# Patient Record
Sex: MALE | Race: WHITE | Employment: FULL TIME | URBAN - METROPOLITAN AREA
[De-identification: names, ages, dates, MRNs, and addresses within clinical notes are randomized per-mention and may not be internally consistent; named-entity substitution may affect disease eponyms.]

---

## 2020-09-02 ENCOUNTER — HOSPITAL ENCOUNTER (OUTPATIENT)
Age: 60
Discharge: HOME | End: 2020-09-02
Attending: FAMILY MEDICINE
Payer: COMMERCIAL

## 2020-09-02 VITALS
HEIGHT: 70 IN | BODY MASS INDEX: 22.19 KG/M2 | DIASTOLIC BLOOD PRESSURE: 96 MMHG | RESPIRATION RATE: 18 BRPM | WEIGHT: 155 LBS | OXYGEN SATURATION: 100 % | TEMPERATURE: 98 F | SYSTOLIC BLOOD PRESSURE: 161 MMHG | HEART RATE: 69 BPM

## 2020-09-02 DIAGNOSIS — S61.211A LACERATION OF LEFT INDEX FINGER WITHOUT FOREIGN BODY WITHOUT DAMAGE TO NAIL, INITIAL ENCOUNTER: Primary | ICD-10-CM

## 2020-09-02 PROCEDURE — 12001 RPR S/N/AX/GEN/TRNK 2.5CM/<: CPT | Performed by: FAMILY MEDICINE

## 2020-09-02 PROCEDURE — 99202 OFFICE O/P NEW SF 15 MIN: CPT | Performed by: FAMILY MEDICINE

## 2020-09-02 NOTE — ED NOTES
Bacitracin and tube gauze dressing applied.  Encouraged to follow up with either his pcp or us in 2 days for wound re-eval. Suture removal 7-10 days either here or pcp office

## 2020-09-02 NOTE — ED PROVIDER NOTES
Patient Seen in: [facility] In [place]      History   Patient presents with:  Laceration Abrasion    Stated Complaint: Cut on Left Hand Finger    HPI    Pt is a 64 yo with aleft index finger laceration about 10 hours ago.  He got th Reviewed - No data to display               MDM   After irrigation and sterile prep, 1 cc lidocaine was injected and 8 simple interrupted 5-0 sutures were placed. Wound care was discussed.               Disposition and Plan     Clinical Impression:  Kenneth Bryson

## 2020-09-02 NOTE — ED INITIAL ASSESSMENT (HPI)
Caught left index finger pad in a garage door this am with lac of pad. No bleeding. laast tetanus 3-4 years ago.

## 2020-09-10 ENCOUNTER — HOSPITAL ENCOUNTER (OUTPATIENT)
Age: 60
Discharge: HOME | End: 2020-09-10
Attending: EMERGENCY MEDICINE
Payer: COMMERCIAL

## 2020-09-10 VITALS
DIASTOLIC BLOOD PRESSURE: 84 MMHG | WEIGHT: 155 LBS | OXYGEN SATURATION: 100 % | HEIGHT: 70 IN | HEART RATE: 69 BPM | TEMPERATURE: 97 F | RESPIRATION RATE: 18 BRPM | BODY MASS INDEX: 22.19 KG/M2 | SYSTOLIC BLOOD PRESSURE: 138 MMHG

## 2020-09-10 DIAGNOSIS — Z48.02 ENCOUNTER FOR REMOVAL OF SUTURES: Primary | ICD-10-CM

## 2020-09-10 PROCEDURE — 99212 OFFICE O/P EST SF 10 MIN: CPT | Performed by: EMERGENCY MEDICINE

## 2020-09-10 NOTE — ED PROVIDER NOTES
Patient Seen in: [facility]      History   Patient presents with:  Sut Stap Sree    Stated Complaint: suture removal    HPI  Patient presents for suture removal.  8 days ago had stitches placed in his left index f Disposition and Plan     Clinical Impression:  Encounter for removal of sutures  (primary encounter diagnosis)    Disposition:  Discharge  9/10/2020  6:45 pm    Follow-up:  [address] in [address]

## 2023-11-19 ENCOUNTER — HOSPITAL ENCOUNTER (EMERGENCY)
Facility: HOSPITAL | Age: 63
Discharge: HOME | End: 2023-11-19
Attending: EMERGENCY MEDICINE
Payer: COMMERCIAL

## 2023-11-19 VITALS
OXYGEN SATURATION: 97 % | HEIGHT: 70 IN | RESPIRATION RATE: 16 BRPM | WEIGHT: 150 LBS | DIASTOLIC BLOOD PRESSURE: 97 MMHG | BODY MASS INDEX: 21.47 KG/M2 | TEMPERATURE: 99 F | HEART RATE: 73 BPM | SYSTOLIC BLOOD PRESSURE: 167 MMHG

## 2023-11-19 DIAGNOSIS — H11.421 CHEMOSIS OF RIGHT CONJUNCTIVA: Primary | ICD-10-CM

## 2023-11-19 PROCEDURE — 99283 EMERGENCY DEPT VISIT LOW MDM: CPT

## 2023-11-19 RX ORDER — KETOTIFEN FUMARATE 0.35 MG/ML
1 SOLUTION/ DROPS OPHTHALMIC 2 TIMES DAILY
Qty: 5 ML | Refills: 0 | Status: SHIPPED | OUTPATIENT
Start: 2023-11-19 | End: 2023-11-26

## 2023-11-19 RX ORDER — TETRACAINE HYDROCHLORIDE 5 MG/ML
1 SOLUTION OPHTHALMIC ONCE
Status: COMPLETED | OUTPATIENT
Start: 2023-11-19 | End: 2023-11-19

## 2023-11-19 NOTE — ED INITIAL ASSESSMENT (HPI)
Pt arrives through triage with complaints of right eye swelling that started earlier today after cutting a tree around 1500. Pt denies pain, drainage, blurry/ double vision. Pt not aware of any allergies.

## 2024-03-18 ENCOUNTER — HOSPITAL ENCOUNTER (OUTPATIENT)
Dept: GENERAL RADIOLOGY | Facility: HOSPITAL | Age: 64
Discharge: HOME | End: 2024-03-18
Attending: FAMILY MEDICINE
Payer: COMMERCIAL

## 2024-03-18 DIAGNOSIS — M25.552 BILATERAL HIP PAIN: ICD-10-CM

## 2024-03-18 DIAGNOSIS — M25.551 BILATERAL HIP PAIN: ICD-10-CM

## 2024-03-18 PROBLEM — F41.1 GENERALIZED ANXIETY DISORDER: Status: ACTIVE | Noted: 2019-04-17

## 2024-03-18 PROBLEM — E55.9 VITAMIN D DEFICIENCY: Status: ACTIVE | Noted: 2019-01-21

## 2024-03-18 PROBLEM — F32.A DEPRESSIVE DISORDER: Status: ACTIVE | Noted: 2019-04-17

## 2024-03-18 PROCEDURE — 73522 X-RAY EXAM HIPS BI 3-4 VIEWS: CPT | Performed by: FAMILY MEDICINE

## 2024-03-19 DIAGNOSIS — M25.551 BILATERAL HIP PAIN: Primary | ICD-10-CM

## 2024-03-19 DIAGNOSIS — M25.552 BILATERAL HIP PAIN: Primary | ICD-10-CM

## 2024-04-15 NOTE — TELEPHONE ENCOUNTER
Please review. Protocol Failed; No Protocol    Requested Prescriptions   Pending Prescriptions Disp Refills    PAROXETINE 30 MG Oral Tab [Pharmacy Med Name: PAROXETINE HCL 30 MG TABLET] 30 tablet 0     Sig: TAKE 1 TABLET BY MOUTH EVERY DAY       Psychiatric Non-Scheduled (Anti-Anxiety) Failed - 4/14/2024 12:36 AM        Failed - Depression Screening completed within the past 12 months        Passed - In person appointment or virtual visit in the past 6 mos or appointment in next 3 mos     Recent [facility]patient Visits              [facility]          Future Appointments         Provider Department Appt Notes    In 2 weeks Caesar Negro MD [facility] Bilateral hip pain                           Future Appointments         Provider Department Appt Notes    In 2 weeks Caesar Negro MD [facility] Bilateral hip pain          Recent Outpatient Visits              4 weeks ago [facility]    [facility] Visit

## 2024-04-19 RX ORDER — PAROXETINE 30 MG/1
30 TABLET, FILM COATED ORAL DAILY
Qty: 30 TABLET | Refills: 1 | Status: SHIPPED | OUTPATIENT
Start: 2024-04-19 | End: 2024-06-13

## 2024-04-19 NOTE — TELEPHONE ENCOUNTER
60 day refill given for Dr. Lang  Please call patient to schedule follow-up with Dr. Lang for further refills.

## 2024-04-23 NOTE — TELEPHONE ENCOUNTER
2nd attempt/not able to leave a message. Per message patient has a voice mail box that has not been set up yet.

## 2024-05-01 ENCOUNTER — OFFICE VISIT (OUTPATIENT)
Dept: ORTHOPEDICS CLINIC | Facility: CLINIC | Age: 64
End: 2024-05-01
Payer: COMMERCIAL

## 2024-05-01 VITALS — WEIGHT: 151 LBS | HEIGHT: 70 IN | BODY MASS INDEX: 21.62 KG/M2

## 2024-05-01 DIAGNOSIS — M16.0 PRIMARY OSTEOARTHRITIS OF BOTH HIPS: Primary | ICD-10-CM

## 2024-05-01 PROCEDURE — 3008F BODY MASS INDEX DOCD: CPT | Performed by: ORTHOPAEDIC SURGERY

## 2024-05-01 PROCEDURE — 99244 OFF/OP CNSLTJ NEW/EST MOD 40: CPT | Performed by: ORTHOPAEDIC SURGERY

## 2024-05-01 NOTE — PROGRESS NOTES
NURSING INTAKE COMMENTS:   Chief Complaint   Patient presents with    Hip Pain     Bilateral - onset 10 years ago - no injury - has pain in bilateral upper legs and has x-rays in the system - rates pain as 6-8/10 with weight bearing and walking - no numbness or tingling        HPI: This 64 year old male presents today with complaints of bilateral hip pain, worse on the right than the left.  It has been present and increasing in severity for a number of years to the point where he is becoming dysfunctional.  He works as a .  It is difficult for him to walk his route.  He has troubles with activities of daily living.  He cannot get his shoes and socks on and off or get on and off the toilet, particularly in the morning.  He can play with his grandchildren.    History reviewed. No pertinent past medical history.  History reviewed. No pertinent surgical history.  Current Outpatient Medications   Medication Sig Dispense Refill    PARoxetine 30 MG Oral Tab Take 1 tablet (30 mg total) by mouth daily. 30 tablet 1    ALPRAZolam 0.25 MG Oral Tab Take 1 tablet (0.25 mg total) by mouth 3 (three) times daily as needed. 20 tablet 0     No Known Allergies  History reviewed. No pertinent family history.    Social History     Occupational History    Not on file   Tobacco Use    Smoking status: Never    Smokeless tobacco: Never   Vaping Use    Vaping status: Never Used   Substance and Sexual Activity    Alcohol use: Never    Drug use: Never    Sexual activity: Not on file        Review of Systems:  GENERAL: feels generally well, no significant weight loss or weight gain  SKIN: no ulcerated or worrisome skin lesions  EYES:denies blurred vision or double vision  HEENT: denies new nasal congestion, sinus pain or ST  LUNGS: denies shortness of breath  CARDIOVASCULAR: denies chest pain  GI: no hematemesis, no worsening heartburn, no diarrhea  : no dysuria, no blood in urine, no difficulty urinating, no  incontinence  MUSCULOSKELETAL: no other musculoskeletal complaints other than in HPI  NEURO: no numbness or tingling, no weakness or balance disorder  PSYCHE: no depression or anxiety  HEMATOLOGIC: no hx of blood dyscrasia  ENDOCRINE: no thyroid or diabetes issues  ALL/ASTHMA: no new hx of severe allergy or asthma    Physical Examination:    Ht 5' 10\" (1.778 m)   Wt 151 lb (68.5 kg)   BMI 21.67 kg/m²   Constitutional: appears well hydrated, alert and responsive, no acute distress noted  Extremities: Markedly antalgic gait, short on the right side.  25 degree right hip flexion contracture with further flexion to 70 degrees.  10 degree left hip flexion contracture with further flexion to 90 degrees.  0 degrees of internal rotation bilaterally, 0 degrees external rotation on the right with 10 degrees external rotation on the left, about 10 degrees of abduction bilaterally.  Neurological: Active plantarflexion and dorsiflexion of the feet.  Pulses: 2+    Imaging: End-stage osteoarthritis bilaterally with complete loss of the joint space, flattening of the femoral head, extensive osteophyte formation and subchondral cyst formation..     No results found for: \"WBC\", \"HGB\", \"PLT\"   No results found for: \"GLU\", \"BUN\", \"CREATSERUM\", \"GFR\", \"GFRNAA\", \"GFRAA\"     Assessment and Plan:  Diagnoses and all orders for this visit:    Primary osteoarthritis of both hips        Assessment: He has end-stage osteoarthritis of both hips with severe deformity and limitations of motion impairing his gait.  There is no role for conservative treatment given the amount of deformity and joint contractures.    Plan: He has advanced osteoarthritis of the right hip that has not responded to conservative management.  I discussed right total hip replacement.  We discussed the risks and indications for surgery as well as the common possible complications.  Our discussion included, but was not limited to the potential risks of anesthetic  complication, infection, bleeding, DVT or PE, nerve or blood vessel injury, stiffness, the potential need for revision surgery, leg length inequality, as well as the potential risk of unanticipated perioperative medical or orthopedic complications. He would like to proceed.  Surgery has been scheduled pending medical clearance.  We will do the left hip 4 to 6 weeks after the right hip.  Both will be done through an anterior approach.        The above note was creating using Dragon speech recognition technology. Please excuse any typos.    LATANYA HA MD

## 2024-05-02 ENCOUNTER — TELEPHONE (OUTPATIENT)
Dept: ORTHOPEDICS CLINIC | Facility: CLINIC | Age: 64
End: 2024-05-02

## 2024-05-02 DIAGNOSIS — M16.0 PRIMARY OSTEOARTHRITIS OF BOTH HIPS: Primary | ICD-10-CM

## 2024-05-02 DIAGNOSIS — M16.11 PRIMARY OSTEOARTHRITIS OF RIGHT HIP: ICD-10-CM

## 2024-05-02 DIAGNOSIS — M16.12 PRIMARY OSTEOARTHRITIS OF LEFT HIP: Primary | ICD-10-CM

## 2024-05-02 NOTE — TELEPHONE ENCOUNTER
Spoke with patient's wife to offer surgery dates. She chose 5/28. She was reminded that Medical and/or Dental clearance is needed within 30 days of surgical date. Failure to complete all testing in a timely manner will cause surgery to be delayed and/or rescheduled. Patient understood and had no further questions at this time.

## 2024-05-02 NOTE — TELEPHONE ENCOUNTER
Type of surgery:  Right total hip arthroplasty, anterior approach  Date: 5/28/24  Location: [facility]  Medical Clearance:      *Medical: Yes      *Dental:Yes      *Other:  Prior Authorization Status: Pending   Workers Comp:  Medacta/Deangelo: Emailed Fredy  Russell: Yes  POV: 6/24/24

## 2024-05-03 NOTE — TELEPHONE ENCOUNTER
Type of surgery:  Left total hip arthroplasty, anterior approach  Date: 7/9/24  Location: [address]  Medical Clearance: N/A     *Medical:      *Dental:      *Other:  Prior Authorization Status: Pending  Workers Comp:  Medacta/Deangelo: Emailed Fredy  Anderson: Yes  POV: 8/7/24

## 2024-05-10 DIAGNOSIS — F41.1 GENERALIZED ANXIETY DISORDER: ICD-10-CM

## 2024-05-12 NOTE — TELEPHONE ENCOUNTER
Please review; protocol failed. Or has no protocol    Requested Prescriptions   Pending Prescriptions Disp Refills    ALPRAZOLAM 0.25 MG Oral Tab [Pharmacy Med Name: ALPRAZOLAM 0.25 MG TABLET] 20 tablet 0     Sig: TAKE 1 TABLET BY MOUTH 3 TIMES DAILY AS NEEDED.       Controlled Substance Medication Failed - 5/10/2024  9:43 PM        Failed - This medication is a controlled substance - forward to provider to refill              Recent Outpatient Visits              1 week ago Primary osteoarthritis of both hips    [facility] Caesar Negro MD    Office Visit    1 month ago Generalized anxiety disorder    [facility] Rowdy Lang DO    Office Visit           Future Appointments         Provider Department Appt Notes    In 2 days Rowdy Lang DO [facility] pre op surgery is on 05/28/2024    In 1 week Thang Perkins PA-C [facility] pre surgical visist Right FAVIO scheduled for 5/28    In 1 month Thang Perkins PA-C [facility] 1st POV Right FAVIO 5/28/ Left hip Sx 7/9/24    In 2 months Thang Perkins PA-C [facility] 1st POV; LTHA Sx 7/9/24

## 2024-05-14 ENCOUNTER — EKG ENCOUNTER (OUTPATIENT)
Dept: LAB | Facility: HOSPITAL | Age: 64
End: 2024-05-14
Attending: FAMILY MEDICINE

## 2024-05-14 ENCOUNTER — HOSPITAL ENCOUNTER (OUTPATIENT)
Dept: GENERAL RADIOLOGY | Facility: HOSPITAL | Age: 64
Discharge: HOME | End: 2024-05-14
Attending: FAMILY MEDICINE

## 2024-05-14 ENCOUNTER — OFFICE VISIT (OUTPATIENT)
Dept: FAMILY MEDICINE CLINIC | Facility: CLINIC | Age: 64
End: 2024-05-14

## 2024-05-14 ENCOUNTER — LAB ENCOUNTER (OUTPATIENT)
Dept: LAB | Facility: HOSPITAL | Age: 64
End: 2024-05-14
Attending: FAMILY MEDICINE

## 2024-05-14 VITALS
SYSTOLIC BLOOD PRESSURE: 152 MMHG | HEART RATE: 66 BPM | OXYGEN SATURATION: 97 % | BODY MASS INDEX: 22.19 KG/M2 | HEIGHT: 70 IN | WEIGHT: 155 LBS | DIASTOLIC BLOOD PRESSURE: 92 MMHG

## 2024-05-14 DIAGNOSIS — M16.11 PRIMARY OSTEOARTHRITIS OF RIGHT HIP: ICD-10-CM

## 2024-05-14 DIAGNOSIS — Z01.818 PRE-OP EXAMINATION: ICD-10-CM

## 2024-05-14 DIAGNOSIS — I10 ESSENTIAL HYPERTENSION: ICD-10-CM

## 2024-05-14 DIAGNOSIS — Z01.818 PRE-OP EXAMINATION: Primary | ICD-10-CM

## 2024-05-14 LAB
ALBUMIN SERPL-MCNC: 4.3 G/DL (ref 3.2–4.8)
ALBUMIN/GLOB SERPL: 1.5 {RATIO} (ref 1–2)
ALP LIVER SERPL-CCNC: 82 U/L
ALT SERPL-CCNC: 21 U/L
ANION GAP SERPL CALC-SCNC: 7 MMOL/L (ref 0–18)
AST SERPL-CCNC: 21 U/L (ref ?–34)
ATRIAL RATE: 67 BPM
BASOPHILS # BLD AUTO: 0.04 X10(3) UL (ref 0–0.2)
BASOPHILS NFR BLD AUTO: 0.6 %
BILIRUB SERPL-MCNC: 0.8 MG/DL (ref 0.2–1.1)
BILIRUB UR QL: NEGATIVE
BUN BLD-MCNC: 18 MG/DL (ref 9–23)
BUN/CREAT SERPL: 16.7 (ref 10–20)
CALCIUM BLD-MCNC: 9.4 MG/DL (ref 8.7–10.4)
CHLORIDE SERPL-SCNC: 107 MMOL/L (ref 98–112)
CLARITY UR: CLEAR
CO2 SERPL-SCNC: 26 MMOL/L (ref 21–32)
CREAT BLD-MCNC: 1.08 MG/DL
DEPRECATED RDW RBC AUTO: 40.1 FL (ref 35.1–46.3)
EGFRCR SERPLBLD CKD-EPI 2021: 76 ML/MIN/1.73M2 (ref 60–?)
EOSINOPHIL # BLD AUTO: 0.15 X10(3) UL (ref 0–0.7)
EOSINOPHIL NFR BLD AUTO: 2.2 %
ERYTHROCYTE [DISTWIDTH] IN BLOOD BY AUTOMATED COUNT: 12.8 % (ref 11–15)
FASTING STATUS PATIENT QL REPORTED: NO
GLOBULIN PLAS-MCNC: 2.8 G/DL (ref 2–3.5)
GLUCOSE BLD-MCNC: 90 MG/DL (ref 70–99)
GLUCOSE UR-MCNC: NORMAL MG/DL
HCT VFR BLD AUTO: 42.2 %
HGB BLD-MCNC: 14.9 G/DL
HGB UR QL STRIP.AUTO: NEGATIVE
IMM GRANULOCYTES # BLD AUTO: 0.02 X10(3) UL (ref 0–1)
IMM GRANULOCYTES NFR BLD: 0.3 %
KETONES UR-MCNC: NEGATIVE MG/DL
LEUKOCYTE ESTERASE UR QL STRIP.AUTO: NEGATIVE
LYMPHOCYTES # BLD AUTO: 1.22 X10(3) UL (ref 1–4)
LYMPHOCYTES NFR BLD AUTO: 17.8 %
MCH RBC QN AUTO: 30.5 PG (ref 26–34)
MCHC RBC AUTO-ENTMCNC: 35.3 G/DL (ref 31–37)
MCV RBC AUTO: 86.3 FL
MONOCYTES # BLD AUTO: 0.41 X10(3) UL (ref 0.1–1)
MONOCYTES NFR BLD AUTO: 6 %
NEUTROPHILS # BLD AUTO: 5.03 X10 (3) UL (ref 1.5–7.7)
NEUTROPHILS # BLD AUTO: 5.03 X10(3) UL (ref 1.5–7.7)
NEUTROPHILS NFR BLD AUTO: 73.1 %
NITRITE UR QL STRIP.AUTO: NEGATIVE
OSMOLALITY SERPL CALC.SUM OF ELEC: 291 MOSM/KG (ref 275–295)
P AXIS: 61 DEGREES
P-R INTERVAL: 158 MS
PH UR: 6 [PH] (ref 5–8)
PLATELET # BLD AUTO: 230 10(3)UL (ref 150–450)
POTASSIUM SERPL-SCNC: 4 MMOL/L (ref 3.5–5.1)
PROT SERPL-MCNC: 7.1 G/DL (ref 5.7–8.2)
PROT UR-MCNC: NEGATIVE MG/DL
Q-T INTERVAL: 402 MS
QRS DURATION: 90 MS
QTC CALCULATION (BEZET): 424 MS
R AXIS: 42 DEGREES
RBC # BLD AUTO: 4.89 X10(6)UL
SODIUM SERPL-SCNC: 140 MMOL/L (ref 136–145)
SP GR UR STRIP: 1.02 (ref 1–1.03)
T AXIS: 70 DEGREES
UROBILINOGEN UR STRIP-ACNC: NORMAL
VENTRICULAR RATE: 67 BPM
WBC # BLD AUTO: 6.9 X10(3) UL (ref 4–11)

## 2024-05-14 PROCEDURE — 85025 COMPLETE CBC W/AUTO DIFF WBC: CPT

## 2024-05-14 PROCEDURE — 93005 ELECTROCARDIOGRAM TRACING: CPT

## 2024-05-14 PROCEDURE — 80053 COMPREHEN METABOLIC PANEL: CPT

## 2024-05-14 PROCEDURE — 36415 COLL VENOUS BLD VENIPUNCTURE: CPT

## 2024-05-14 PROCEDURE — 87641 MR-STAPH DNA AMP PROBE: CPT

## 2024-05-14 PROCEDURE — 81003 URINALYSIS AUTO W/O SCOPE: CPT | Performed by: FAMILY MEDICINE

## 2024-05-14 PROCEDURE — 99214 OFFICE O/P EST MOD 30 MIN: CPT | Performed by: FAMILY MEDICINE

## 2024-05-14 PROCEDURE — 3080F DIAST BP >= 90 MM HG: CPT | Performed by: FAMILY MEDICINE

## 2024-05-14 PROCEDURE — 93010 ELECTROCARDIOGRAM REPORT: CPT | Performed by: INTERNAL MEDICINE

## 2024-05-14 PROCEDURE — 3077F SYST BP >= 140 MM HG: CPT | Performed by: FAMILY MEDICINE

## 2024-05-14 PROCEDURE — 3008F BODY MASS INDEX DOCD: CPT | Performed by: FAMILY MEDICINE

## 2024-05-14 PROCEDURE — 71046 X-RAY EXAM CHEST 2 VIEWS: CPT | Performed by: FAMILY MEDICINE

## 2024-05-14 RX ORDER — TELMISARTAN 20 MG/1
20 TABLET ORAL DAILY
Qty: 30 TABLET | Refills: 0 | Status: SHIPPED | OUTPATIENT
Start: 2024-05-14

## 2024-05-14 NOTE — PROGRESS NOTES
Subjective:   Saturnino Patterson is a 64 year old male who presents for Pre-Op Exam (Right total hip arthroplasty, anterior approach with Caesar Negro MD on 5/28 )       History/Other:    Chief Complaint Reviewed and Verified  Nursing Notes Reviewed and   Verified  Tobacco Reviewed  Allergies Reviewed  Medications Reviewed    Problem List Reviewed  Medical History Reviewed  Surgical History   Reviewed  Family History Reviewed  Social History Reviewed         Tobacco:  He has never smoked tobacco.    Current Outpatient Medications   Medication Sig Dispense Refill    Telmisartan 20 MG Oral Tab Take 1 tablet (20 mg total) by mouth daily. 30 tablet 0    PARoxetine 30 MG Oral Tab Take 1 tablet (30 mg total) by mouth daily. 30 tablet 1    ALPRAZolam 0.25 MG Oral Tab Take 1 tablet (0.25 mg total) by mouth 3 (three) times daily as needed. 20 tablet 0         Review of Systems:  Review of Systems   Constitutional: Negative.    HENT: Negative.     Eyes: Negative.    Respiratory: Negative.     Cardiovascular: Negative.    Gastrointestinal: Negative.    Endocrine: Negative for polydipsia, polyphagia and polyuria.   Genitourinary: Negative.    Musculoskeletal:  Positive for arthralgias.   Skin: Negative.         No mole changes   Allergic/Immunologic: Negative for environmental allergies.   Neurological:  Negative for dizziness, weakness, numbness and headaches.   Hematological: Negative.    Psychiatric/Behavioral:  Negative for sleep disturbance.         No anxiety or depressed feelings         Objective:   BP (!) 152/92 (BP Location: Right arm, Patient Position: Sitting, Cuff Size: adult)   Pulse 66   Ht 5' 10\" (1.778 m)   Wt 155 lb (70.3 kg)   SpO2 97%   BMI 22.24 kg/m²  Estimated body mass index is 22.24 kg/m² as calculated from the following:    Height as of this encounter: 5' 10\" (1.778 m).    Weight as of this encounter: 155 lb (70.3 kg).  Physical Exam  Vitals reviewed.   Constitutional:        Appearance: Normal appearance. He is well-developed.   HENT:      Head: Normocephalic.      Right Ear: Tympanic membrane, ear canal and external ear normal.      Left Ear: Tympanic membrane, ear canal and external ear normal.      Nose: Nose normal.   Eyes:      General: Lids are normal.      Conjunctiva/sclera: Conjunctivae normal.      Pupils: Pupils are equal, round, and reactive to light.      Funduscopic exam:     Right eye: No hemorrhage or papilledema.         Left eye: No hemorrhage or papilledema.   Neck:      Vascular: Normal carotid pulses. No JVD.      Trachea: Trachea normal.   Cardiovascular:      Rate and Rhythm: Regular rhythm.      Pulses:           Carotid pulses are 2+ on the right side and 2+ on the left side.       Radial pulses are 2+ on the right side and 2+ on the left side.      Heart sounds: Normal heart sounds.   Pulmonary:      Breath sounds: Normal breath sounds.   Abdominal:      Tenderness: There is no abdominal tenderness.   Musculoskeletal:      Cervical back: Normal, normal range of motion and neck supple.      Thoracic back: Normal.      Lumbar back: Normal.      Right hip: Tenderness present. Decreased range of motion.      Left hip: Tenderness present. Decreased range of motion.   Lymphadenopathy:      Cervical: No cervical adenopathy.   Skin:     Comments: No suspicious lesions waist up exam   Neurological:      General: No focal deficit present.      Mental Status: He is alert and oriented to person, place, and time.      Sensory: No sensory deficit.      Deep Tendon Reflexes: Reflexes are normal and symmetric.   Psychiatric:         Mood and Affect: Mood normal. Mood is not anxious or depressed.           Assessment & Plan:   1. Pre-op examination (Primary)  -     CBC With Differential With Platelet; Future; Expected date: 05/14/2024  -     Comp Metabolic Panel (14); Future; Expected date: 05/14/2024  -     Urinalysis with Culture Reflex  -     EKG 12 Lead; Future; Expected  date: 05/14/2024  -     MRSA Screen by PCR; Future; Expected date: 05/14/2024  -     XR CHEST PA + LAT CHEST (CPT=71046); Future; Expected date: 05/14/2024  2. Primary osteoarthritis of right hip  -     CBC With Differential With Platelet; Future; Expected date: 05/14/2024  -     Comp Metabolic Panel (14); Future; Expected date: 05/14/2024  -     Urinalysis with Culture Reflex  -     EKG 12 Lead; Future; Expected date: 05/14/2024  -     MRSA Screen by PCR; Future; Expected date: 05/14/2024  -     XR CHEST PA + LAT CHEST (CPT=71046); Future; Expected date: 05/14/2024  3. Essential hypertension  Other orders  -     Telmisartan; Take 1 tablet (20 mg total) by mouth daily.  Dispense: 30 tablet; Refill: 0  I need to start treatment for his elevated blood pressure and demonstrate improvement prior to clearing for surgery.  Recommend follow up in one week.      No follow-ups on file.    Rowdy Lang DO, 5/14/2024, 2:42 PM

## 2024-05-15 LAB — MRSA DNA SPEC QL NAA+PROBE: NEGATIVE

## 2024-05-17 RX ORDER — ALPRAZOLAM 0.25 MG/1
0.25 TABLET ORAL 3 TIMES DAILY PRN
Qty: 20 TABLET | Refills: 0 | Status: SHIPPED | OUTPATIENT
Start: 2024-05-17

## 2024-05-22 ENCOUNTER — OFFICE VISIT (OUTPATIENT)
Dept: ORTHOPEDICS CLINIC | Facility: CLINIC | Age: 64
End: 2024-05-22

## 2024-05-22 DIAGNOSIS — M16.11 PRIMARY OSTEOARTHRITIS OF RIGHT HIP: Primary | ICD-10-CM

## 2024-05-22 PROCEDURE — 99213 OFFICE O/P EST LOW 20 MIN: CPT | Performed by: PHYSICIAN ASSISTANT

## 2024-05-22 NOTE — PROGRESS NOTES
NURSING INTAKE COMMENTS:   Chief Complaint   Patient presents with    Pre-Op Exam     Pt schedueled for R FAVIO on 7/9/24. Received dental clearance. Has appt with pcp tomorrow.       HPI: This 64 year old male presents today for preoperative consultation.  He is scheduled for right total hip arthroplasty through an anterior approach next week.  He is anxious to proceed with surgery.  He continues to have pain from his hip down to his knee.  He has received his dental clearance.  He is seeing Dr. Lang tomorrow to check his blood pressure and give final clearance.    History reviewed. No pertinent past medical history.  History reviewed. No pertinent surgical history.  Current Outpatient Medications   Medication Sig Dispense Refill    ALPRAZolam 0.25 MG Oral Tab Take 1 tablet (0.25 mg total) by mouth 3 (three) times daily as needed. 20 tablet 0    Telmisartan 20 MG Oral Tab Take 1 tablet (20 mg total) by mouth daily. 30 tablet 0    PARoxetine 30 MG Oral Tab Take 1 tablet (30 mg total) by mouth daily. 30 tablet 1     No Known Allergies  History reviewed. No pertinent family history.    Social History     Occupational History    Not on file   Tobacco Use    Smoking status: Never    Smokeless tobacco: Never   Vaping Use    Vaping status: Never Used   Substance and Sexual Activity    Alcohol use: Never    Drug use: Never    Sexual activity: Not on file        Review of Systems:  GENERAL: feels generally well, no significant weight loss or weight gain  SKIN: no ulcerated or worrisome skin lesions  EYES:denies blurred vision or double vision  HEENT: denies new nasal congestion, sinus pain or ST  LUNGS: denies shortness of breath  CARDIOVASCULAR: denies chest pain  GI: no hematemesis, no worsening heartburn, no diarrhea  : no dysuria, no blood in urine, no difficulty urinating, no incontinence  MUSCULOSKELETAL: no other musculoskeletal complaints other than in HPI  NEURO: no numbness or tingling, no weakness or balance  disorder  PSYCHE: no depression or anxiety  HEMATOLOGIC: no hx of blood dyscrasia  ENDOCRINE: no thyroid or diabetes issues  ALL/ASTHMA: no new hx of severe allergy or asthma    Physical Examination:    There were no vitals taken for this visit.  Constitutional: appears well hydrated, alert and responsive, no acute distress noted  Extremities: He walks with an antalgic gait.  The rest of his exam is unchanged.    Imaging: XR CHEST PA + LAT CHEST (CPT=71046)    Result Date: 5/14/2024         PROCEDURE: XR CHEST PA + LAT CHEST (CPT=71046)  COMPARISON: None.  INDICATIONS: Pre-operative examination for surgery in 2 weeks.  TECHNIQUE:   Two views.   Findings and impression:  Normal heart size, clear lungs, normal pleura.     Dictated by (CST): Tawanda Castro MD on 5/14/2024 at 4:22 PM     Finalized by (CST): Tawanda Castro MD on 5/14/2024 at 4:22 PM             Lab Results   Component Value Date    WBC 6.9 05/14/2024    HGB 14.9 05/14/2024    .0 05/14/2024      Lab Results   Component Value Date    GLU 90 05/14/2024    BUN 18 05/14/2024    CREATSERUM 1.08 05/14/2024        Assessment and Plan:  Diagnoses and all orders for this visit:    Primary osteoarthritis of right hip        Plan: Mr. Patterson is scheduled for right total hip arthroplasty through an anterior approach next week.  We discussed the surgery and the expected risks and benefits.  We discussed the expected recovery time.  I answered all of his questions.  He has received dental clearance.  He should have dental clearance after tomorrow's appointment with Dr. Patel.  I had him make a 3-week postop visit.  Advised him to call if any questions or problems arise prior to his upcoming appointment.    The above note was creating using Dragon speech recognition technology. Please excuse any typos.    This visit was performed under the supervision of Dr. Caesar Negro who formulated the treatment plan and decision making.

## 2024-05-23 ENCOUNTER — LAB ENCOUNTER (OUTPATIENT)
Dept: LAB | Facility: HOSPITAL | Age: 64
End: 2024-05-23
Attending: ORTHOPAEDIC SURGERY

## 2024-05-23 ENCOUNTER — OFFICE VISIT (OUTPATIENT)
Dept: FAMILY MEDICINE CLINIC | Facility: CLINIC | Age: 64
End: 2024-05-23

## 2024-05-23 VITALS
SYSTOLIC BLOOD PRESSURE: 138 MMHG | BODY MASS INDEX: 21.62 KG/M2 | HEIGHT: 70 IN | HEART RATE: 93 BPM | WEIGHT: 151 LBS | DIASTOLIC BLOOD PRESSURE: 84 MMHG

## 2024-05-23 DIAGNOSIS — Z01.818 PRE-OP TESTING: ICD-10-CM

## 2024-05-23 DIAGNOSIS — I10 ESSENTIAL HYPERTENSION: Primary | ICD-10-CM

## 2024-05-23 LAB
ANTIBODY SCREEN: NEGATIVE
RH BLOOD TYPE: POSITIVE

## 2024-05-23 PROCEDURE — 86900 BLOOD TYPING SEROLOGIC ABO: CPT

## 2024-05-23 PROCEDURE — 99214 OFFICE O/P EST MOD 30 MIN: CPT | Performed by: FAMILY MEDICINE

## 2024-05-23 PROCEDURE — 3079F DIAST BP 80-89 MM HG: CPT | Performed by: FAMILY MEDICINE

## 2024-05-23 PROCEDURE — 3075F SYST BP GE 130 - 139MM HG: CPT | Performed by: FAMILY MEDICINE

## 2024-05-23 PROCEDURE — 36415 COLL VENOUS BLD VENIPUNCTURE: CPT

## 2024-05-23 PROCEDURE — 86901 BLOOD TYPING SEROLOGIC RH(D): CPT

## 2024-05-23 PROCEDURE — 86850 RBC ANTIBODY SCREEN: CPT

## 2024-05-23 PROCEDURE — 3008F BODY MASS INDEX DOCD: CPT | Performed by: FAMILY MEDICINE

## 2024-05-23 RX ORDER — TELMISARTAN 20 MG/1
20 TABLET ORAL DAILY
Qty: 30 TABLET | Refills: 5 | Status: SHIPPED | OUTPATIENT
Start: 2024-05-23

## 2024-05-23 NOTE — PROGRESS NOTES
Subjective:   Saturnino Patterson is a 64 year old male who presents for Follow - Up (Pt presents for a 2 month f/u on his blood pressure. )       History/Other:    Chief Complaint Reviewed and Verified  Nursing Notes Reviewed and   Verified  Tobacco Reviewed  Allergies Reviewed  Medications Reviewed    Medical History Reviewed  Surgical History Reviewed  Family History   Reviewed  Social History Reviewed         Tobacco:  He smoked tobacco in the past but quit greater than 12 months ago.  Social History     Tobacco Use   Smoking Status Former    Current packs/day: 0.00    Average packs/day: 0.5 packs/day for 5.0 years (2.5 ttl pk-yrs)    Types: Cigarettes    Start date:     Quit date:     Years since quittin.4   Smokeless Tobacco Never        Current Outpatient Medications   Medication Sig Dispense Refill    Telmisartan 20 MG Oral Tab Take 1 tablet (20 mg total) by mouth daily. 30 tablet 5    ALPRAZolam 0.25 MG Oral Tab Take 1 tablet (0.25 mg total) by mouth 3 (three) times daily as needed. 20 tablet 0    PARoxetine 30 MG Oral Tab Take 1 tablet (30 mg total) by mouth daily. 30 tablet 1         Review of Systems:  Review of Systems   Constitutional: Negative.    Respiratory: Negative.     Cardiovascular: Negative.    Neurological: Negative.          Objective:   /84   Pulse 93   Ht 5' 10\" (1.778 m)   Wt 151 lb (68.5 kg)   BMI 21.67 kg/m²  Estimated body mass index is 21.67 kg/m² as calculated from the following:    Height as of this encounter: 5' 10\" (1.778 m).    Weight as of this encounter: 151 lb (68.5 kg).  Physical Exam  Vitals reviewed.   Cardiovascular:      Rate and Rhythm: Normal rate and regular rhythm.      Heart sounds: Normal heart sounds.   Pulmonary:      Breath sounds: Normal breath sounds.           Assessment & Plan:   1. Essential hypertension (Primary)  Other orders  -     Telmisartan; Take 1 tablet (20 mg total) by mouth daily.  Dispense: 30 tablet; Refill:  5    Responded well to med.  ECG had non specific findings and he is an active patient physically with no restrictions besides his orthopedic pain.  He is clear to proceed.       No follow-ups on file.    Rowdy Lang DO, 5/23/2024, 2:50 PM

## 2024-05-23 NOTE — H&P
[facility]  part of [facility]    History & Physical    Saturnino Patterson Patient Status:  Outpatient in a Bed    1960 MRN [id number]   Location [facility] OPERATING ROOM Attending Caesar Negro, *   Hosp Day # 0 PCP Rowdy Lang, DO     Date:  2024    History provided by:patient  Chief Complaint:   Right hip pain    HPI:   Saturnino Patterson is a(n) 64 year old male.  He presents with complaints of bilateral hip pain, worse on the right than the left.  It has been present and increasing in severity for a number of years to the point where he is becoming dysfunctional.  He works as a .  It is difficult for him to walk his route.  He has troubles with activities of daily living.  He cannot get his shoes and socks on and off or get on and off the toilet, particularly in the morning.  He can play with his grandchildren.     History     Past Medical History:    Anxiety state    Depression    High blood pressure    Osteoarthritis     History reviewed. No pertinent surgical history.  Family History   Problem Relation Age of Onset    Heart Disorder Father      Social History:  Social History     Socioeconomic History    Marital status:    Tobacco Use    Smoking status: Former     Current packs/day: 0.00     Average packs/day: 0.5 packs/day for 5.0 years (2.5 ttl pk-yrs)     Types: Cigarettes     Start date:      Quit date:      Years since quittin.4    Smokeless tobacco: Never   Vaping Use    Vaping status: Never Used   Substance and Sexual Activity    Alcohol use: Never    Drug use: Never     Social Determinants of Health      Received from [facility]     Allergies/Medications:   Allergies: No Known Allergies  No medications prior to admission.       Review of Systems:   Pertinent items are noted in HPI.    Physical Exam:   Vital Signs:  Height 5' 10\" (1.778 m), weight 155 lb (70.3 kg).     General appearance: alert, appears  stated age and cooperative  Extremities:  Markedly antalgic gait, short on the right side.  25 degree right hip flexion contracture with further flexion to 70 degrees.  10 degree left hip flexion contracture with further flexion to 90 degrees.  0 degrees of internal rotation bilaterally, 0 degrees external rotation on the right with 10 degrees external rotation on the left, about 10 degrees of abduction bilaterally.  Neurological: Active plantarflexion and dorsiflexion of the feet.  Pulses: 2+  it        Results:     Lab Results   Component Value Date    WBC 6.9 05/14/2024    HGB 14.9 05/14/2024    HCT 42.2 05/14/2024    .0 05/14/2024    CREATSERUM 1.08 05/14/2024    BUN 18 05/14/2024     05/14/2024    K 4.0 05/14/2024     05/14/2024    CO2 26.0 05/14/2024    GLU 90 05/14/2024    CA 9.4 05/14/2024    ALB 4.3 05/14/2024    ALKPHO 82 05/14/2024    BILT 0.8 05/14/2024    TP 7.1 05/14/2024    AST 21 05/14/2024    ALT 21 05/14/2024     Imaging: End-stage osteoarthritis bilaterally with complete loss of the joint space, flattening of the femoral head, extensive osteophyte formation and subchondral cyst formation..       No results found.        Assessment/Plan:     He has end-stage osteoarthritis of both hips with severe deformity and limitations of motion impairing his gait.  There is no role for conservative treatment given the amount of deformity and joint contractures.  He has advanced osteoarthritis of the right hip that has not responded to conservative management.  We discussed right total hip replacement.  We discussed the risks and indications for surgery as well as the common possible complications.  Our discussion included, but was not limited to the potential risks of anesthetic complication, infection, bleeding, DVT or PE, nerve or blood vessel injury, stiffness, the potential need for revision surgery, leg length inequality, as well as the potential risk of unanticipated perioperative  medical or orthopedic complications. He would like to proceed. We will most likely proceed with left hip replacement in July.  He has received his clearances.      NANCY SANZ PA-C  5/23/2024

## 2024-05-28 ENCOUNTER — APPOINTMENT (OUTPATIENT)
Dept: GENERAL RADIOLOGY | Facility: HOSPITAL | Age: 64
DRG: 470 | End: 2024-05-28
Attending: PHYSICIAN ASSISTANT
Payer: COMMERCIAL

## 2024-05-28 ENCOUNTER — ANESTHESIA EVENT (OUTPATIENT)
Dept: SURGERY | Facility: HOSPITAL | Age: 64
DRG: 470 | End: 2024-05-28
Payer: COMMERCIAL

## 2024-05-28 ENCOUNTER — ANESTHESIA (OUTPATIENT)
Dept: SURGERY | Facility: HOSPITAL | Age: 64
DRG: 470 | End: 2024-05-28
Payer: COMMERCIAL

## 2024-05-28 ENCOUNTER — HOSPITAL ENCOUNTER (INPATIENT)
Facility: HOSPITAL | Age: 64
LOS: 2 days | Discharge: HOME HEALTH | DRG: 470 | End: 2024-05-30
Attending: ORTHOPAEDIC SURGERY | Admitting: ORTHOPAEDIC SURGERY
Payer: COMMERCIAL

## 2024-05-28 ENCOUNTER — APPOINTMENT (OUTPATIENT)
Dept: GENERAL RADIOLOGY | Facility: HOSPITAL | Age: 64
DRG: 470 | End: 2024-05-28
Attending: ORTHOPAEDIC SURGERY
Payer: COMMERCIAL

## 2024-05-28 DIAGNOSIS — M16.11 PRIMARY OSTEOARTHRITIS OF RIGHT HIP: ICD-10-CM

## 2024-05-28 DIAGNOSIS — Z01.818 PRE-OP TESTING: ICD-10-CM

## 2024-05-28 DIAGNOSIS — G89.18 POSTOPERATIVE PAIN: Primary | ICD-10-CM

## 2024-05-28 PROBLEM — I10 ESSENTIAL HYPERTENSION: Status: ACTIVE | Noted: 2024-05-28

## 2024-05-28 LAB
DEPRECATED RDW RBC AUTO: 44.1 FL (ref 35.1–46.3)
ERYTHROCYTE [DISTWIDTH] IN BLOOD BY AUTOMATED COUNT: 13.2 % (ref 11–15)
GLUCOSE BLDC GLUCOMTR-MCNC: 150 MG/DL (ref 70–99)
HCT VFR BLD AUTO: 33.7 %
HGB BLD-MCNC: 11 G/DL
MCH RBC QN AUTO: 29.6 PG (ref 26–34)
MCHC RBC AUTO-ENTMCNC: 32.6 G/DL (ref 31–37)
MCV RBC AUTO: 90.8 FL
PLATELET # BLD AUTO: 183 10(3)UL (ref 150–450)
RBC # BLD AUTO: 3.71 X10(6)UL
WBC # BLD AUTO: 5.8 X10(3) UL (ref 4–11)

## 2024-05-28 PROCEDURE — 99222 1ST HOSP IP/OBS MODERATE 55: CPT | Performed by: HOSPITALIST

## 2024-05-28 PROCEDURE — 3008F BODY MASS INDEX DOCD: CPT | Performed by: HOSPITALIST

## 2024-05-28 PROCEDURE — 73502 X-RAY EXAM HIP UNI 2-3 VIEWS: CPT | Performed by: PHYSICIAN ASSISTANT

## 2024-05-28 PROCEDURE — 3078F DIAST BP <80 MM HG: CPT | Performed by: HOSPITALIST

## 2024-05-28 PROCEDURE — 76000 FLUOROSCOPY <1 HR PHYS/QHP: CPT | Performed by: ORTHOPAEDIC SURGERY

## 2024-05-28 PROCEDURE — 0SR904A REPLACEMENT OF RIGHT HIP JOINT WITH CERAMIC ON POLYETHYLENE SYNTHETIC SUBSTITUTE, UNCEMENTED, OPEN APPROACH: ICD-10-PCS | Performed by: ORTHOPAEDIC SURGERY

## 2024-05-28 PROCEDURE — 3074F SYST BP LT 130 MM HG: CPT | Performed by: HOSPITALIST

## 2024-05-28 DEVICE — ACETABULAR SHELL Ø68 MULTI HOLES
Type: IMPLANTABLE DEVICE | Status: FUNCTIONAL
Brand: MPACT ACETABULAR SYSTEM

## 2024-05-28 DEVICE — CANCELLOUS BONE SCREW Ø 6.5 L 40
Type: IMPLANTABLE DEVICE | Site: HIP | Status: FUNCTIONAL
Brand: MPACT EXTENSION

## 2024-05-28 DEVICE — HIP REPLACEMENT WITH CERAMIC HEAD: Type: IMPLANTABLE DEVICE | Site: HIP

## 2024-05-28 DEVICE — FEMORAL HEAD Ø 36 SIZE L
Type: IMPLANTABLE DEVICE | Site: HIP | Status: FUNCTIONAL
Brand: MECTACER BIOLOX DELTA FEMORAL BALL HEAD

## 2024-05-28 DEVICE — FLAT PE HC LINER Ø 36 / J
Type: IMPLANTABLE DEVICE | Status: FUNCTIONAL
Brand: MPACT ACETABULAR SYSTEM

## 2024-05-28 DEVICE — AMISTEM-P LAT STEM #6
Type: IMPLANTABLE DEVICE | Site: HIP | Status: FUNCTIONAL
Brand: AMISTEM-P

## 2024-05-28 RX ORDER — MORPHINE SULFATE 4 MG/ML
4 INJECTION, SOLUTION INTRAMUSCULAR; INTRAVENOUS EVERY 10 MIN PRN
Status: DISCONTINUED | OUTPATIENT
Start: 2024-05-28 | End: 2024-05-28 | Stop reason: HOSPADM

## 2024-05-28 RX ORDER — TRANEXAMIC ACID 10 MG/ML
1000 INJECTION, SOLUTION INTRAVENOUS ONCE
Status: COMPLETED | OUTPATIENT
Start: 2024-05-28 | End: 2024-05-28

## 2024-05-28 RX ORDER — MORPHINE SULFATE 10 MG/ML
6 INJECTION, SOLUTION INTRAMUSCULAR; INTRAVENOUS EVERY 10 MIN PRN
Status: DISCONTINUED | OUTPATIENT
Start: 2024-05-28 | End: 2024-05-28 | Stop reason: HOSPADM

## 2024-05-28 RX ORDER — ACETAMINOPHEN 325 MG/1
650 TABLET ORAL EVERY 6 HOURS PRN
Status: ACTIVE | OUTPATIENT
Start: 2024-05-28 | End: 2024-05-29

## 2024-05-28 RX ORDER — HYDROCODONE BITARTRATE AND ACETAMINOPHEN 7.5; 325 MG/1; MG/1
1 TABLET ORAL EVERY 6 HOURS PRN
Status: ACTIVE | OUTPATIENT
Start: 2024-05-28 | End: 2024-05-29

## 2024-05-28 RX ORDER — MAGNESIUM HYDROXIDE 1200 MG/15ML
LIQUID ORAL CONTINUOUS PRN
Status: DISCONTINUED | OUTPATIENT
Start: 2024-05-28 | End: 2024-05-28 | Stop reason: HOSPADM

## 2024-05-28 RX ORDER — FAMOTIDINE 20 MG/1
20 TABLET, FILM COATED ORAL 2 TIMES DAILY
Status: DISCONTINUED | OUTPATIENT
Start: 2024-05-28 | End: 2024-05-30

## 2024-05-28 RX ORDER — POLYETHYLENE GLYCOL 3350 17 G/17G
17 POWDER, FOR SOLUTION ORAL DAILY PRN
Status: DISCONTINUED | OUTPATIENT
Start: 2024-05-28 | End: 2024-05-30

## 2024-05-28 RX ORDER — SENNOSIDES 8.6 MG
17.2 TABLET ORAL NIGHTLY
Status: DISCONTINUED | OUTPATIENT
Start: 2024-05-28 | End: 2024-05-30

## 2024-05-28 RX ORDER — TRANEXAMIC ACID 10 MG/ML
INJECTION, SOLUTION INTRAVENOUS AS NEEDED
Status: DISCONTINUED | OUTPATIENT
Start: 2024-05-28 | End: 2024-05-28 | Stop reason: SURG

## 2024-05-28 RX ORDER — SODIUM CHLORIDE, SODIUM LACTATE, POTASSIUM CHLORIDE, CALCIUM CHLORIDE 600; 310; 30; 20 MG/100ML; MG/100ML; MG/100ML; MG/100ML
INJECTION, SOLUTION INTRAVENOUS CONTINUOUS
Status: DISCONTINUED | OUTPATIENT
Start: 2024-05-28 | End: 2024-05-30

## 2024-05-28 RX ORDER — LOSARTAN POTASSIUM 25 MG/1
25 TABLET ORAL DAILY
Status: DISCONTINUED | OUTPATIENT
Start: 2024-05-29 | End: 2024-05-30

## 2024-05-28 RX ORDER — FAMOTIDINE 10 MG/ML
20 INJECTION, SOLUTION INTRAVENOUS 2 TIMES DAILY
Status: DISCONTINUED | OUTPATIENT
Start: 2024-05-28 | End: 2024-05-30

## 2024-05-28 RX ORDER — SODIUM CHLORIDE, SODIUM LACTATE, POTASSIUM CHLORIDE, CALCIUM CHLORIDE 600; 310; 30; 20 MG/100ML; MG/100ML; MG/100ML; MG/100ML
INJECTION, SOLUTION INTRAVENOUS CONTINUOUS
Status: DISCONTINUED | OUTPATIENT
Start: 2024-05-28 | End: 2024-05-28 | Stop reason: HOSPADM

## 2024-05-28 RX ORDER — BUPIVACAINE HYDROCHLORIDE 7.5 MG/ML
INJECTION, SOLUTION INTRASPINAL
Status: COMPLETED | OUTPATIENT
Start: 2024-05-28 | End: 2024-05-28

## 2024-05-28 RX ORDER — MIDAZOLAM HYDROCHLORIDE 1 MG/ML
INJECTION INTRAMUSCULAR; INTRAVENOUS AS NEEDED
Status: DISCONTINUED | OUTPATIENT
Start: 2024-05-28 | End: 2024-05-28 | Stop reason: SURG

## 2024-05-28 RX ORDER — ACETAMINOPHEN 500 MG
1000 TABLET ORAL ONCE
Status: COMPLETED | OUTPATIENT
Start: 2024-05-28 | End: 2024-05-28

## 2024-05-28 RX ORDER — ONDANSETRON 2 MG/ML
4 INJECTION INTRAMUSCULAR; INTRAVENOUS EVERY 6 HOURS PRN
Status: DISCONTINUED | OUTPATIENT
Start: 2024-05-28 | End: 2024-05-28 | Stop reason: HOSPADM

## 2024-05-28 RX ORDER — HYDROMORPHONE HYDROCHLORIDE 1 MG/ML
0.2 INJECTION, SOLUTION INTRAMUSCULAR; INTRAVENOUS; SUBCUTANEOUS EVERY 5 MIN PRN
Status: DISCONTINUED | OUTPATIENT
Start: 2024-05-28 | End: 2024-05-28 | Stop reason: HOSPADM

## 2024-05-28 RX ORDER — HYDROMORPHONE HYDROCHLORIDE 1 MG/ML
0.4 INJECTION, SOLUTION INTRAMUSCULAR; INTRAVENOUS; SUBCUTANEOUS
Status: ACTIVE | OUTPATIENT
Start: 2024-05-28 | End: 2024-05-29

## 2024-05-28 RX ORDER — PROCHLORPERAZINE EDISYLATE 5 MG/ML
5 INJECTION INTRAMUSCULAR; INTRAVENOUS ONCE AS NEEDED
Status: ACTIVE | OUTPATIENT
Start: 2024-05-28 | End: 2024-05-28

## 2024-05-28 RX ORDER — HYDROMORPHONE HYDROCHLORIDE 1 MG/ML
0.6 INJECTION, SOLUTION INTRAMUSCULAR; INTRAVENOUS; SUBCUTANEOUS
Status: ACTIVE | OUTPATIENT
Start: 2024-05-28 | End: 2024-05-29

## 2024-05-28 RX ORDER — MAGNESIUM OXIDE 400 MG (241.3 MG MAGNESIUM) TABLET
325 TABLET
Status: DISCONTINUED | OUTPATIENT
Start: 2024-05-28 | End: 2024-05-30

## 2024-05-28 RX ORDER — BISACODYL 10 MG
10 SUPPOSITORY, RECTAL RECTAL
Status: DISCONTINUED | OUTPATIENT
Start: 2024-05-28 | End: 2024-05-30

## 2024-05-28 RX ORDER — MORPHINE SULFATE 1 MG/ML
INJECTION, SOLUTION EPIDURAL; INTRATHECAL; INTRAVENOUS
Status: COMPLETED | OUTPATIENT
Start: 2024-05-28 | End: 2024-05-28

## 2024-05-28 RX ORDER — SODIUM CHLORIDE 9 MG/ML
INJECTION, SOLUTION INTRAVENOUS CONTINUOUS
Status: DISCONTINUED | OUTPATIENT
Start: 2024-05-28 | End: 2024-05-30

## 2024-05-28 RX ORDER — METOCLOPRAMIDE HYDROCHLORIDE 5 MG/ML
10 INJECTION INTRAMUSCULAR; INTRAVENOUS EVERY 8 HOURS PRN
Status: DISCONTINUED | OUTPATIENT
Start: 2024-05-28 | End: 2024-05-28 | Stop reason: HOSPADM

## 2024-05-28 RX ORDER — HYDROCODONE BITARTRATE AND ACETAMINOPHEN 7.5; 325 MG/1; MG/1
1 TABLET ORAL EVERY 6 HOURS PRN
Status: DISCONTINUED | OUTPATIENT
Start: 2024-05-28 | End: 2024-05-30

## 2024-05-28 RX ORDER — LIDOCAINE HYDROCHLORIDE 10 MG/ML
INJECTION, SOLUTION EPIDURAL; INFILTRATION; INTRACAUDAL; PERINEURAL AS NEEDED
Status: DISCONTINUED | OUTPATIENT
Start: 2024-05-28 | End: 2024-05-28 | Stop reason: SURG

## 2024-05-28 RX ORDER — LIDOCAINE HYDROCHLORIDE 10 MG/ML
INJECTION, SOLUTION INFILTRATION; PERINEURAL
Status: COMPLETED | OUTPATIENT
Start: 2024-05-28 | End: 2024-05-28

## 2024-05-28 RX ORDER — ASPIRIN 325 MG
325 TABLET, DELAYED RELEASE (ENTERIC COATED) ORAL 2 TIMES DAILY
Status: DISCONTINUED | OUTPATIENT
Start: 2024-05-28 | End: 2024-05-30

## 2024-05-28 RX ORDER — HYDROMORPHONE HYDROCHLORIDE 1 MG/ML
0.4 INJECTION, SOLUTION INTRAMUSCULAR; INTRAVENOUS; SUBCUTANEOUS EVERY 5 MIN PRN
Status: DISCONTINUED | OUTPATIENT
Start: 2024-05-28 | End: 2024-05-28 | Stop reason: HOSPADM

## 2024-05-28 RX ORDER — DIPHENHYDRAMINE HYDROCHLORIDE 50 MG/ML
25 INJECTION INTRAMUSCULAR; INTRAVENOUS ONCE AS NEEDED
Status: ACTIVE | OUTPATIENT
Start: 2024-05-28 | End: 2024-05-28

## 2024-05-28 RX ORDER — NALBUPHINE HYDROCHLORIDE 10 MG/ML
2.5 INJECTION, SOLUTION INTRAMUSCULAR; INTRAVENOUS; SUBCUTANEOUS EVERY 4 HOURS PRN
Status: ACTIVE | OUTPATIENT
Start: 2024-05-28 | End: 2024-05-29

## 2024-05-28 RX ORDER — HYDROCODONE BITARTRATE AND ACETAMINOPHEN 7.5; 325 MG/1; MG/1
2 TABLET ORAL EVERY 6 HOURS PRN
Status: ACTIVE | OUTPATIENT
Start: 2024-05-28 | End: 2024-05-29

## 2024-05-28 RX ORDER — DIPHENHYDRAMINE HCL 25 MG
25 CAPSULE ORAL EVERY 4 HOURS PRN
Status: DISCONTINUED | OUTPATIENT
Start: 2024-05-28 | End: 2024-05-30

## 2024-05-28 RX ORDER — MORPHINE SULFATE 4 MG/ML
2 INJECTION, SOLUTION INTRAMUSCULAR; INTRAVENOUS EVERY 10 MIN PRN
Status: DISCONTINUED | OUTPATIENT
Start: 2024-05-28 | End: 2024-05-28 | Stop reason: HOSPADM

## 2024-05-28 RX ORDER — NALOXONE HYDROCHLORIDE 0.4 MG/ML
0.08 INJECTION, SOLUTION INTRAMUSCULAR; INTRAVENOUS; SUBCUTANEOUS
Status: ACTIVE | OUTPATIENT
Start: 2024-05-28 | End: 2024-05-29

## 2024-05-28 RX ORDER — DOCUSATE SODIUM 100 MG/1
100 CAPSULE, LIQUID FILLED ORAL 2 TIMES DAILY
Status: DISCONTINUED | OUTPATIENT
Start: 2024-05-28 | End: 2024-05-30

## 2024-05-28 RX ORDER — HYDROMORPHONE HYDROCHLORIDE 1 MG/ML
0.6 INJECTION, SOLUTION INTRAMUSCULAR; INTRAVENOUS; SUBCUTANEOUS EVERY 5 MIN PRN
Status: DISCONTINUED | OUTPATIENT
Start: 2024-05-28 | End: 2024-05-28 | Stop reason: HOSPADM

## 2024-05-28 RX ORDER — HYDROMORPHONE HYDROCHLORIDE 1 MG/ML
0.2 INJECTION, SOLUTION INTRAMUSCULAR; INTRAVENOUS; SUBCUTANEOUS EVERY 2 HOUR PRN
Status: DISCONTINUED | OUTPATIENT
Start: 2024-05-28 | End: 2024-05-30

## 2024-05-28 RX ORDER — SODIUM CHLORIDE, SODIUM LACTATE, POTASSIUM CHLORIDE, CALCIUM CHLORIDE 600; 310; 30; 20 MG/100ML; MG/100ML; MG/100ML; MG/100ML
INJECTION, SOLUTION INTRAVENOUS CONTINUOUS PRN
Status: DISCONTINUED | OUTPATIENT
Start: 2024-05-28 | End: 2024-05-28 | Stop reason: SURG

## 2024-05-28 RX ORDER — ENEMA 19; 7 G/133ML; G/133ML
1 ENEMA RECTAL ONCE AS NEEDED
Status: DISCONTINUED | OUTPATIENT
Start: 2024-05-28 | End: 2024-05-30

## 2024-05-28 RX ORDER — DIPHENHYDRAMINE HYDROCHLORIDE 50 MG/ML
12.5 INJECTION INTRAMUSCULAR; INTRAVENOUS EVERY 4 HOURS PRN
Status: DISCONTINUED | OUTPATIENT
Start: 2024-05-28 | End: 2024-05-30

## 2024-05-28 RX ORDER — NALOXONE HYDROCHLORIDE 0.4 MG/ML
80 INJECTION, SOLUTION INTRAMUSCULAR; INTRAVENOUS; SUBCUTANEOUS AS NEEDED
Status: DISCONTINUED | OUTPATIENT
Start: 2024-05-28 | End: 2024-05-28 | Stop reason: HOSPADM

## 2024-05-28 RX ORDER — ALPRAZOLAM 0.25 MG/1
0.25 TABLET ORAL 3 TIMES DAILY PRN
Status: DISCONTINUED | OUTPATIENT
Start: 2024-05-28 | End: 2024-05-30

## 2024-05-28 RX ORDER — HYDROMORPHONE HYDROCHLORIDE 1 MG/ML
0.4 INJECTION, SOLUTION INTRAMUSCULAR; INTRAVENOUS; SUBCUTANEOUS EVERY 2 HOUR PRN
Status: DISCONTINUED | OUTPATIENT
Start: 2024-05-28 | End: 2024-05-30

## 2024-05-28 RX ORDER — DIPHENHYDRAMINE HYDROCHLORIDE 50 MG/ML
12.5 INJECTION INTRAMUSCULAR; INTRAVENOUS EVERY 4 HOURS PRN
Status: ACTIVE | OUTPATIENT
Start: 2024-05-28 | End: 2024-05-29

## 2024-05-28 RX ORDER — HALOPERIDOL 5 MG/ML
0.5 INJECTION INTRAMUSCULAR ONCE AS NEEDED
Status: ACTIVE | OUTPATIENT
Start: 2024-05-28 | End: 2024-05-28

## 2024-05-28 RX ORDER — ONDANSETRON 2 MG/ML
4 INJECTION INTRAMUSCULAR; INTRAVENOUS ONCE AS NEEDED
Status: ACTIVE | OUTPATIENT
Start: 2024-05-28 | End: 2024-05-28

## 2024-05-28 RX ORDER — DIPHENHYDRAMINE HCL 25 MG
25 CAPSULE ORAL EVERY 4 HOURS PRN
Status: ACTIVE | OUTPATIENT
Start: 2024-05-28 | End: 2024-05-29

## 2024-05-28 RX ORDER — CELECOXIB 200 MG/1
400 CAPSULE ORAL ONCE
Status: COMPLETED | OUTPATIENT
Start: 2024-05-28 | End: 2024-05-28

## 2024-05-28 RX ORDER — ONDANSETRON 2 MG/ML
4 INJECTION INTRAMUSCULAR; INTRAVENOUS EVERY 6 HOURS PRN
Status: DISCONTINUED | OUTPATIENT
Start: 2024-05-28 | End: 2024-05-30

## 2024-05-28 RX ADMIN — BUPIVACAINE HYDROCHLORIDE 1.6 ML: 7.5 INJECTION, SOLUTION INTRASPINAL at 07:49:00

## 2024-05-28 RX ADMIN — SODIUM CHLORIDE, SODIUM LACTATE, POTASSIUM CHLORIDE, CALCIUM CHLORIDE: 600; 310; 30; 20 INJECTION, SOLUTION INTRAVENOUS at 09:23:00

## 2024-05-28 RX ADMIN — MORPHINE SULFATE 0.3 MG: 1 INJECTION, SOLUTION EPIDURAL; INTRATHECAL; INTRAVENOUS at 07:49:00

## 2024-05-28 RX ADMIN — MIDAZOLAM HYDROCHLORIDE 2 MG: 1 INJECTION INTRAMUSCULAR; INTRAVENOUS at 07:51:00

## 2024-05-28 RX ADMIN — LIDOCAINE HYDROCHLORIDE 25 MG: 10 INJECTION, SOLUTION EPIDURAL; INFILTRATION; INTRACAUDAL; PERINEURAL at 07:59:00

## 2024-05-28 RX ADMIN — TRANEXAMIC ACID 1000 MG: 10 INJECTION, SOLUTION INTRAVENOUS at 09:24:00

## 2024-05-28 RX ADMIN — SODIUM CHLORIDE, SODIUM LACTATE, POTASSIUM CHLORIDE, CALCIUM CHLORIDE: 600; 310; 30; 20 INJECTION, SOLUTION INTRAVENOUS at 08:45:00

## 2024-05-28 RX ADMIN — LIDOCAINE HYDROCHLORIDE 5 ML: 10 INJECTION, SOLUTION INFILTRATION; PERINEURAL at 07:49:00

## 2024-05-28 RX ADMIN — SODIUM CHLORIDE, SODIUM LACTATE, POTASSIUM CHLORIDE, CALCIUM CHLORIDE: 600; 310; 30; 20 INJECTION, SOLUTION INTRAVENOUS at 07:49:00

## 2024-05-28 NOTE — ANESTHESIA PREPROCEDURE EVALUATION
Anesthesia PreOp Note    HPI:     Saturnino Patterson is a 64 year old male who presents for preoperative consultation requested by: Caesar Negro MD    Date of Surgery: 5/28/2024    Procedure(s):  Right total hip arthroplasty, anterior approach  Indication: Primary osteoarthritis of right hip [314297]    Relevant Problems   No relevant active problems       NPO:  Last Liquid Consumption Date: 05/27/24  Last Liquid Consumption Time: 2100  Last Solid Consumption Date: 05/27/24  Last Solid Consumption Time: 1800  Last Liquid Consumption Date: 05/27/24          History Review:  Patient Active Problem List    Diagnosis Date Noted    Primary osteoarthritis of right hip 05/22/2024    Depressive disorder 04/17/2019    Generalized anxiety disorder 04/17/2019    Vitamin D deficiency 01/21/2019    Seasonal affective disorder (HCC) 03/01/2016    Insomnia related to another mental disorder 02/10/2016       Past Medical History:    Anxiety state    Depression    High blood pressure    Osteoarthritis       History reviewed. No pertinent surgical history.    Medications Prior to Admission   Medication Sig Dispense Refill Last Dose    Telmisartan 20 MG Oral Tab Take 1 tablet (20 mg total) by mouth daily. 30 tablet 5 5/27/2024 at 2100    ALPRAZolam 0.25 MG Oral Tab Take 1 tablet (0.25 mg total) by mouth 3 (three) times daily as needed. 20 tablet 0 Past Month    PARoxetine 30 MG Oral Tab Take 1 tablet (30 mg total) by mouth daily. 30 tablet 1 5/27/2024 at 2100     Current Facility-Administered Medications Ordered in Epic   Medication Dose Route Frequency Provider Last Rate Last Admin    lactated ringers infusion   Intravenous Continuous Caesar Negro MD 20 mL/hr at 05/28/24 0649 New Bag at 05/28/24 0649    ceFAZolin (Ancef) 2g in 10mL IV syringe premix  2 g Intravenous Once Caesar Negro MD        vancomycin (Vancocin) 1,000 mg in sodium chloride 0.9% 250 mL IVPB-ADDV  1,000 mg Intravenous Once Marky  Caesar De La Cruz  mL/hr at 24 0652 1,000 mg at 24 06     No current [facility]-ordered outpatient medications on file.       No Known Allergies    Family History   Problem Relation Age of Onset    Heart Disorder Father      Social History     Socioeconomic History    Marital status:    Tobacco Use    Smoking status: Former     Current packs/day: 0.00     Average packs/day: 0.5 packs/day for 5.0 years (2.5 ttl pk-yrs)     Types: Cigarettes     Start date:      Quit date:      Years since quittin.4    Smokeless tobacco: Never   Vaping Use    Vaping status: Never Used   Substance and Sexual Activity    Alcohol use: Never    Drug use: Never       Available pre-op labs reviewed.  Lab Results   Component Value Date    WBC 6.9 2024    RBC 4.89 2024    HGB 14.9 2024    HCT 42.2 2024    MCV 86.3 2024    MCH 30.5 2024    MCHC 35.3 2024    RDW 12.8 2024    .0 2024     Lab Results   Component Value Date     2024    K 4.0 2024     2024    CO2 26.0 2024    BUN 18 2024    CREATSERUM 1.08 2024    GLU 90 2024    CA 9.4 2024          Vital Signs:  Body mass index is 22.24 kg/m².   height is 1.778 m (5' 10\") and weight is 70.3 kg (155 lb). His oral temperature is 97.9 °F (36.6 °C). His blood pressure is 151/98 (abnormal) and his pulse is 68. His respiration is 16 and oxygen saturation is 97%.   Vitals:    24 0819 24   BP:  (!) 151/98   Pulse:  68   Resp:  16   Temp:  97.9 °F (36.6 °C)   TempSrc:  Oral   SpO2:  97%   Weight: 70.3 kg (155 lb) 70.3 kg (155 lb)   Height: 1.778 m (5' 10\") 1.778 m (5' 10\")        Anesthesia Evaluation     Patient summary reviewed    Airway   Mallampati: II  TM distance: >3 FB  Neck ROM: full  Dental - Dentition appears grossly intact     Pulmonary - negative ROS and normal exam   (-) asthma, shortness of breath, sleep apnea  Cardiovascular  - normal exam  (+) hypertension  (-) past MI, dysrhythmias    ECG reviewed    Neuro/Psych - negative ROS   (-) CVA    GI/Hepatic/Renal - negative ROS   (-) GERD    Endo/Other    (+) arthritis  (-) diabetes mellitus  Abdominal                  Anesthesia Plan:   ASA:  2  Plan:   Spinal  Post-op Pain Management: IV analgesics, Oral pain medication and Intrathecal narcotics  Informed Consent Plan and Risks Discussed With:  Patient  Discussed plan with:  CRNA      I have informed Saturnino Patterson and/or legal guardian or family member of the nature of the anesthetic plan, benefits, risks including possible dental damage if relevant, major complications, and any alternative forms of anesthetic management.   All of the patient's questions were answered to the best of my ability. The patient desires the anesthetic management as planned.  VASQUEZ AVILA MD  5/28/2024 7:14 AM  Present on Admission:  **None**

## 2024-05-28 NOTE — PHYSICAL THERAPY NOTE
PHYSICAL THERAPY HIP EVALUATION - INPATIENT     Room Number: 426/426-A  Evaluation Date: 5/28/2024  Type of Evaluation: Initial  Physician Order: PT Eval and Treat    Presenting Problem: s/p R FAVIO, anterior approach on 5/28     Reason for Therapy: Mobility Dysfunction and Discharge Planning    PHYSICAL THERAPY ASSESSMENT   Patient is a 64 year old male admitted 5/28/2024 for R FAVIO.  Prior to admission, patient's baseline is independent with ADLs and functional mobility without assistive device.  Patient is currently functioning below baseline with bed mobility, transfers, gait, stair negotiation, standing prolonged periods, and performing household tasks.  Patient is requiring stand-by assist and contact guard assist as a result of the following impairments: decreased functional strength, pain, impaired dynamic standing balance, medical status, and limited R hip ROM.  Physical Therapy will continue to follow for duration of hospitalization.    Patient will benefit from continued skilled PT Services at discharge to promote functional independence and safety with additional support and return home with home health PT.    PLAN  PT Treatment Plan: Bed mobility;Body mechanics;Endurance;Energy conservation;Patient education;Gait training;Strengthening;Stair training;Transfer training;Balance training  Rehab Potential : Good  Frequency (Obs): BID    PHYSICAL THERAPY MEDICAL/SOCIAL HISTORY     Problem List  Principal Problem:    Primary osteoarthritis of right hip  Active Problems:    Essential hypertension      HOME SITUATION  Home Situation  Type of Home: House  Home Layout: Multi-level;Bed/bath upstairs  Stairs to Enter : 2  Stairs to Bedroom: 5  Railing: Yes  Lives With: Spouse  Drives: Yes  Patient Owned Equipment: Rolling walker;Cane  Patient Regularly Uses: None     Prior Level of [place]: independent with ADLs and functional mobility without assistive device     SUBJECTIVE  Agreeable to activity.     PHYSICAL  THERAPY EXAMINATION   OBJECTIVE  Precautions: Other (Comment);None  Fall Risk: Standard fall risk    WEIGHT BEARING RESTRICTION  Weight Bearing Restriction: R lower extremity  R Lower Extremity: Weight Bearing as Tolerated    PAIN ASSESSMENT  Rating: Unable to rate  Location: right hip  Management Techniques: Activity promotion;Body mechanics;Repositioning    COGNITION  Overall Cognitive Status:  WFL - within functional limits    RANGE OF MOTION AND STRENGTH ASSESSMENT  Upper extremity ROM and strength are within functional limits.  Lower extremity ROM is within functional limits.  Lower extremity strength is within functional limits.    BALANCE  Static Sitting: Good  Dynamic Sitting: Fair +  Static Standing: Fair  Dynamic Standing: Fair -    AM-PAC '6-Clicks' INPATIENT SHORT FORM - BASIC MOBILITY  How much difficulty does the patient currently have...  Patient Difficulty: Turning over in bed (including adjusting bedclothes, sheets and blankets)?: A Little   Patient Difficulty: Sitting down on and standing up from a chair with arms (e.g., wheelchair, bedside commode, etc.): A Little   Patient Difficulty: Moving from lying on back to sitting on the side of the bed?: A Little   How much help from another person does the patient currently need...   Help from Another: Moving to and from a bed to a chair (including a wheelchair)?: A Little   Help from Another: Need to walk in hospital room?: A Little   Help from Another: Climbing 3-5 steps with a railing?: A Little     AM-PAC Score:  Raw Score: 18   Approx Degree of Impairment: 46.58%   Standardized Score (AM-PAC Scale): 43.63   CMS Modifier (G-Code): CK    FUNCTIONAL ABILITY STATUS  Functional Mobility/Gait Assessment  Gait Assistance: Contact guard assist  Distance (ft): 3  Assistive Device: Rolling walker  Pattern: Shuffle;R Decreased stance time  Supine to Sit: stand-by assist  Sit to Stand: contact guard assist    Exercise/Education Provided:  Bed mobility  Body  mechanics  Energy conservation  Functional activity tolerated  Gait training  Posture  Lower therapeutic exercise:  Ankle pumps  Hip AB/AD  LAQ  Quad sets  Transfer training    RN approved PT eval. Pt received resting in bed with wife at bedside. Introduced self and role. Educated on WBAT, FAVIO exercise, goals for today. Pt verbalized understanding. Pt demos bed mobility with SBA, STS transfer with RW, and steps to chair with RW. Activity limited due to dizziness and syncopal episode earlier today. Pt was left sitting in chair with needs within reach, handoff to RN complete.     The patient's Approx Degree of Impairment: 46.58% has been calculated based on documentation in the [facility] '6 clicks' Inpatient Basic Mobility Short Form.  Research supports that patients with this level of impairment may benefit from home with  PT.  Final disposition will be made by interdisciplinary medical team.    Patient End of Session: Up in chair;Needs met;Call light within reach;RN aware of session/findings;All patient questions and concerns addressed;Family present    CURRENT GOALS  Goals to be met by: 6/4/24  Patient Goal Patient's self-stated goal is: go home   Goal #1 Patient is able to demonstrate supine - sit EOB @ level: modified independent   Goal #1   Current Status    Goal #2 Patient is able to demonstrate transfers Sit to/from Stand at assistance level: supervision     Goal #2  Current Status    Goal #3 Patient is able to ambulate 150 feet with assistive device at assistance level: supervision   Goal #3   Current Status    Goal #4 Patient will negotiate 5 stairs/one curb w/ assistive device and supervision   Goal #4   Current Status    Goal #5 Patient verbalizes and/or demonstrates all precautions and safety concerns independently   Goal #5   Current Status    Goal #6 Patient independently performs home exercise program for ROM/strengthening per the instructions provided in preparation for discharge.   Goal #6  Current  Status      Patient Evaluation Complexity Level:  History Low - no personal factors and/or co-morbidities   Examination of body systems Low -  addressing 1-2 elements   Clinical Presentation Low- Stable   Clinical Decision Making  Low Complexity     Therapeutic Activity:  20 minutes

## 2024-05-28 NOTE — SPIRITUAL CARE NOTE
Spiritual Care Visit Note    Patient Name: Saturnino Patterson Date of Spiritual Care Visit: 24   : 1960 Primary Dx: Primary osteoarthritis of right hip       Referred By: Referral From: Nurse    Spiritual Care Taxonomy:    Intended Effects: Demonstrate caring and concern    Methods: Offer support;Demonstrate acceptance;Collaborate with care team member    Interventions: Active listening;Ask guided questions;Discuss plan of care    Visit Type/Summary:    I spoke with patient to check in and see how things were going after a code had been called.  Patient shared about the discomfort of needing help and how that impacts his concerns with his situation.  He shared about his grandson and the importance of being able to play with him and to spend time with him in a meaningful way.  We spoke about the way life teaches you and the lesson learned.  During our conversation the patient's wife arrived and she shared about her experience witnessing her  go through what he's been going through.   remains available.    Chaplain Gilliland [phone]

## 2024-05-28 NOTE — ANESTHESIA PROCEDURE NOTES
Spinal Block    Date/Time: 5/28/2024 7:49 AM    Performed by: Alyson Carrera CRNA  Authorized by: Brittny Juárez MD      General Information and Staff    Start Time:  5/28/2024 7:49 AM  End Time:  5/28/2024 7:51 AM  Anesthesiologist:  Brittny Juárez MD  CRNA:  Alyson Carrera CRNA  Performed by:  Anesthesiologist  Site identification: surface landmarks    Preanesthetic Checklist: patient identified, IV checked, risks and benefits discussed, monitors and equipment checked, pre-op evaluation, anesthesia consent and sterile technique used      Procedure Details    Patient Position:  Sitting  Prep: ChloraPrep    Monitoring:  Continuous pulse ox and heart rate  Approach:  Midline  Location:  L3-4  Injection Technique:  Single-shot    Needle    Needle Type:   Needle Gauge:  24 G  Needle Length:  4 in    Assessment    Sensory Level:  T10  Events: clear CSF, CSF aspirated, well tolerated, sensory level and blood negative      Additional Comments     Strict sterile technique

## 2024-05-28 NOTE — OPERATIVE REPORT
OPERATIVE REPORT     PREOPERATIVE DIAGNOSIS:  Osteoarthritis, right hip.  POSTOPERATIVE DIAGNOSIS:  Osteoarthritis, right hip.    PROCEDURE:  right total hip arthroplasty via anterior approach with C-arm control.     ASSISTANT:  Thang Perkins PA-C.       INDICATIONS:  The patient is a 64 year old male with advanced osteoarthritis of the right hip that has not responded to conservative management.  After discussion of the options for treatment, he requested the above procedure.  Our discussion included, but was not limited to, the potential risks of anesthetic complication, infection, DVT or PE, leg length inequality, periprosthetic fracture, injuries to local nerves or blood vessels, bleeding requiring transfusion, periprosthetic fracture, as well as the risk of unanticipated perioperative medical or orthopedic complications.  Written consent was obtained.     OPERATIVE TECHNIQUE:  The patient was brought to the operating room and placed under spinal anesthesia.  Ancef, tranexamic acid, and vancomycin were administered prophylactically.  A time-out was performed.  The right hip and lower extremity were prepped and draped in the usual sterile fashion.  An oblique incision was made just distal and lateral to the ASIS and carried to the fascia over the TFL.  The fascia was divided in line with the skin incision.  The TFL was reflected posteriorly within its sheath, and the rectus was reflected anteriorly.  The anterior circumflex femoral vessels were identified, coagulated, and ligated, and an anterior capsulotomy was performed.  A femoral neck osteotomy was made at the appropriate level and orientation.  The femoral head was removed.  A Charnley retractor was placed within the hip capsule to facilitate exposure, and the acetabular labrum was excised.  The acetabulum was reamed sequentially to 67 mm, at which point good bleeding subchondral bone was obtained.  Depth of reaming as well as inclination and version  were verified with the C-arm.  A Medacta Mpact 68mm multi-hole acetabulum was inserted in the appropriate version and inclination and obtained a good press-fit.  A screw was placed in the ilium for supplementary fixation and obtained excellent purchase.  A highly cross-linked flat 36mm ID liner was locked into the shell, and attention was turned to the femur.       The pubofemoral and ischiofemoral ligaments were released, and the femur was externally rotated.  With no traction applied, the leg was extended externally, rotated, and adducted.  Standard retractors were placed around the proximal femur, and a box osteotome was used to open the femoral canal, followed by the canal opening broach.  The femur was then broached up to accept a Medacta AMIS size 6 Lat broach, which obtained excellent press-fit with excellent rotational stability.  The standard trial neck was applied, and a trial reduction was performed with a 36 mm +4mm head.  Check x-rays were taken with the C-arm and overlaid with the preoperative views, verifying appropriate leg length and offset.  The trial components were then removed, and the actual #6 Lat stem was inserted in the femur.  An excellent press-fit was obtained with good rotational stability.  The  ceramic head matching the trial size was applied to a clean dry trunnion, and the hip was reduced.       A final radiograph was taken and verified excellent re-creation of leg length and offset.  The wound was irrigated and closed in routine fashion in layers.  Sponge and instrument counts were correct at the end of the procedure.  Estimated blood loss was 250 mL.  There were no complications.  The routine specimens were sent to Pathology.  The patient was stable under the care of Anesthesia at the completion of the procedure.     LATANYA HA MD 5/28/2024

## 2024-05-28 NOTE — ANESTHESIA POSTPROCEDURE EVALUATION
Patient: Saturnino Patterson    Procedure Summary       Date: 05/28/24 Room / Location: [facility] MAIN OR  / [facility] MAIN OR    Anesthesia Start: 0737 Anesthesia Stop: 1001    Procedure: Right total hip arthroplasty, anterior approach (Right: Hip) Diagnosis:       Primary osteoarthritis of right hip      (Primary osteoarthritis of right hip [[id number]])    Surgeons: Caesar Negro MD Anesthesiologist: Brittny Juárez MD    Anesthesia Type: spinal ASA Status: 2            Anesthesia Type: spinal    Vitals Value Taken Time   /66 05/28/24 1043   Temp 98.1 °F (36.7 °C) 05/28/24 1044   Pulse 47 05/28/24 1047   Resp 16 05/28/24 1047   SpO2 99 % 05/28/24 1047   Vitals shown include unfiled device data.    [facility] AN Post Evaluation:   Patient Evaluated in PACU  Patient Participation: complete - patient participated  Level of Consciousness: awake  Pain Score: 0  Pain Management: adequate  Airway Patency:patent  Yes    Cardiovascular Status: acceptable  Respiratory Status: acceptable  Postoperative Hydration acceptable    Alyson Carrera, CRNA  5/28/2024 11:02 AM

## 2024-05-28 NOTE — CM/SW NOTE
05/28/24 1500   CM/SW Referral Data   Referral Source Social Work (self-referral)   Reason for Referral Discharge planning   Informant Patient;Spouse/Significant Other;Son  (Arelis Patterson)   Medical Hx   Does patient have an established PCP? Yes  (Rowdy Lang)   Patient Info   Patient's Current Mental Status at Time of Assessment Alert;Oriented   Patient's Home Environment House   Number of Levels in Home 2   Patient lives with Spouse/Significant other   Patient Status Prior to Admission   Independent with ADLs and Mobility Yes   Discharge Needs   Anticipated D/C needs Home health care   Choice of Post-Acute Provider   Informed patient of right to choose their preferred provider Yes   List of appropriate post-acute services provided to patient/family with quality data Yes   Information given to Patient;Spouse/Significant other       SW self-referral.    SW intern met with pt, wife and son at bedside. Above assessment completed.     Pt is home with wife.     Pt is independent, no assistive device.   No home O2.     No hx with HH.     HH list provided to pt. Pt was agreeable to reviewing list. Pt was informed that Dr. Mckeon [facility]. Pt states that he would like to review other HH agencies listed.     SW/CM to follow up with choice.     Plan: Home with HH - pending choice    Lucy Ramirez, REGINO Intern, MSW candidate.

## 2024-05-28 NOTE — PLAN OF CARE
Problem: Patient Centered Care  Goal: Patient preferences are identified and integrated in the patient's plan of care  Description: Interventions:  - What would you like us to know as we care for you? Patient is from home with wife who is an employee here and she's his support system.  - Provide timely, complete, and accurate information to patient/family  - Incorporate patient and family knowledge, values, beliefs, and cultural backgrounds into the planning and delivery of care  - Encourage patient/family to participate in care and decision-making at the level they choose  - Honor patient and family perspectives and choices  Outcome: Progressing     Problem: Patient/Family Goals  Goal: Patient/Family Long Term Goal  Description: Patient's Long Term Goal: Patient will be able to ambulate well and will have no complications from surgery.    Interventions:  - Up as tolerated with walker, WBAT to right leg.  - Maintain hip precautions as instructed.  - Monitor incision for any signs of infection and bleeding.  - Pain management with oral medication.  - Take oral medication for anticoagulation to prevent blood clots.  - May ice incision to prevent swelling or help reduce pain.  - Home health PT at home.  - See additional Care Plan goals for specific interventions  Outcome: Progressing  Goal: Patient/Family Short Term Goal  Description: Patient's Short Term Goal: Home with [facility] when passes PT    Interventions:   - Up as tolerated with walker, WBAT to right leg.  - Maintain hip precautions as instructed.  - Monitor incision for any signs of infection and bleeding.  - Pain management with oral medication.  - SCD's to both legs and administer oral medication for anticoagulation to prevent blood clots.  - May ice incision to prevent swelling or help reduce pain.  - PT/OT as ordered.  - See additional Care Plan goals for specific interventions  Outcome: Progressing     Problem: PAIN - ADULT  Goal: Verbalizes/displays  adequate comfort level or patient's stated pain goal  Description: INTERVENTIONS:  - Encourage pt to monitor pain and request assistance  - Assess pain using appropriate pain scale  - Administer analgesics based on type and severity of pain and evaluate response  - Implement non-pharmacological measures as appropriate and evaluate response  - Consider cultural and social influences on pain and pain management  - Manage/alleviate anxiety  - Utilize distraction and/or relaxation techniques  - Monitor for opioid side effects  - Notify MD/LIP if interventions unsuccessful or patient reports new pain  - Anticipate increased pain with activity and pre-medicate as appropriate  Outcome: Progressing     Problem: RISK FOR INFECTION - ADULT  Goal: Absence of fever/infection during anticipated neutropenic period  Description: INTERVENTIONS  - Monitor WBC  - Administer growth factors as ordered  - Implement neutropenic guidelines  Outcome: Progressing     Problem: SAFETY ADULT - FALL  Goal: Free from fall injury  Description: INTERVENTIONS:  - Assess pt frequently for physical needs  - Identify cognitive and physical deficits and behaviors that affect risk of falls.  - [place] fall precautions as indicated by assessment.  - Educate pt/family on patient safety including physical limitations  - Instruct pt to call for assistance with activity based on assessment  - Modify environment to reduce risk of injury  - Provide assistive devices as appropriate  - Consider OT/PT consult to assist with strengthening/mobility  - Encourage toileting schedule  Outcome: Progressing     Problem: DISCHARGE PLANNING  Goal: Discharge to home or other facility with appropriate resources  Description: INTERVENTIONS:  - Identify barriers to discharge w/pt and caregiver  - Include patient/family/discharge partner in discharge planning  - Arrange for needed discharge resources and transportation as appropriate  - Identify discharge learning needs  (meds, wound care, etc)  - Arrange for interpreters to assist at discharge as needed  - Consider post-discharge preferences of patient/family/discharge partner  - Complete POLST form as appropriate  - Assess patient's ability to be responsible for managing their own health  - Refer to Case Management Department for coordinating discharge planning if the patient needs post-hospital services based on physician/LIP order or complex needs related to functional status, cognitive ability or social support system  Outcome: Progressing     Problem: GENITOURINARY - ADULT  Goal: Absence of urinary retention  Description: INTERVENTIONS:  - Assess patient’s ability to void and empty bladder  - Monitor intake/output and perform bladder scan as needed  - Follow urinary retention protocol/standard of care  - Consider collaborating with pharmacy to review patient's medication profile  - Implement strategies to promote bladder emptying  Outcome: Progressing     Problem: SKIN/TISSUE INTEGRITY - ADULT  Goal: Incision(s), wounds(s) or drain site(s) healing without S/S of infection  Description: INTERVENTIONS:  - Assess and document risk factors for pressure ulcer development  - Assess and document skin integrity  - Assess and document dressing/incision, wound bed, drain sites and surrounding tissue  - Implement wound care per orders  - Initiate isolation precautions as appropriate  - Initiate Pressure Ulcer prevention bundle as indicated  Outcome: Progressing     Problem: MUSCULOSKELETAL - ADULT  Goal: Return mobility to safest level of function  Description: INTERVENTIONS:  - Assess patient stability and activity tolerance for standing, transferring and ambulating w/ or w/o assistive devices  - Assist with transfers and ambulation using safe patient handling equipment as needed  - Ensure adequate protection for wounds/incisions during mobilization  - Obtain PT/OT consults as needed  - Advance activity as appropriate  - Communicate  ordered activity level and limitations with patient/family  Outcome: Progressing  Goal: Maintain proper alignment of affected body part  Description: INTERVENTIONS:  - Support and protect limb and body alignment per provider's orders  - Instruct and reinforce with patient and family use of appropriate assistive device and precautions (e.g. spinal or hip dislocation precautions)  Outcome: Progressing    Patient is alert and oriented, aware to call for help as needed.  Patient is currently in room air, denies shortness of breathing nor chest pain.  Patient can be up with help using a walker when ready.  Patient is denying pain at this time.  Patient has a lozano catheter, draining to yellow colored urine in good amount.  Patient plans to go home with [facility] when stable.

## 2024-05-28 NOTE — CM/SW NOTE
Department  notified of request for HHC, aidin referrals started. Assigned CM/SW to follow up with pt/family on further discharge planning.      Gilma Reilly  DSC

## 2024-05-28 NOTE — BRIEF OP NOTE
Pre-Operative Diagnosis: Primary osteoarthritis of right hip [342229]     Post-Operative Diagnosis: Primary osteoarthritis of right hip [544193]      Procedure Performed:   Right total hip arthroplasty, anterior approach    Surgeons and Role:     * Latanya Negro MD - Primary    Assistant(s):  Surgical Assistant.: Ninfa Obrien  PA: Thang Perkins PA-C     Surgical Findings: OA     Specimen: path     Estimated Blood Loss: Blood Output: 250 mL (5/28/2024  9:29 AM)    LATANYA NEGRO MD  5/28/2024  9:36 AM

## 2024-05-28 NOTE — CONSULTS
[facility]    PATIENT'S NAME: HIPOLITO VIDAL   ATTENDING PHYSICIAN: Caesar Negro MD   CONSULTING PHYSICIAN: Meme Matthews MD   PATIENT ACCOUNT#:   [id number]    LOCATION:  [facility] PACU 7 [facility] 10  MEDICAL RECORD #:   [id number]       YOB: 1960  ADMISSION DATE:       05/28/2024      CONSULT DATE:  05/28/2024    REPORT OF CONSULTATION      REASON FOR ADMISSION:  Right total hip arthroplasty.    HISTORY OF PRESENT ILLNESS:  The patient is a 64-year-old  male with chronic right hip pain and underlying severe primary osteoarthritis, failed outpatient conservative medical management options, scheduled today for above-mentioned procedure by his orthopedic surgeon, Dr. Caesar Negro.  Preoperatively, he had spinal block.  Postoperatively transferred to PACU for further monitoring.    PAST MEDICAL HISTORY:  Generalized osteoarthritis, anxiety, depression, hypertension.    PAST SURGICAL HISTORY:  None.    MEDICATIONS:  Please see medication reconciliation list.    ALLERGIES:  No known drug allergies.     SOCIAL HISTORY:  Ex-tobacco user.  No current tobacco, alcohol, or drug use.  Lives with his family.  Independent in his basic activities of daily living.    FAMILY HISTORY:  Positive for heart disease.    REVIEW OF SYSTEMS:  Currently resting in bed.  No chest pain.  No shortness of breath.  No abdominal pain.  No right hip pain.  Other 12-point review of systems negative.      PHYSICAL EXAMINATION:    GENERAL:  Alert, oriented to time, place, and person, no acute distress.  VITAL SIGNS:  Temperature 97.6, pulse 46, respiratory rate 11, blood pressure 115/79, pulse ox 100% on room air.  HEENT:  Atraumatic.  Oropharynx clear.  Moist mucous membranes.  Ears, nose normal.  Eyes:  Anicteric sclerae.  NECK:  Supple.  No lymphadenopathy.  Trachea midline.  Full range of motion.  LUNGS:  Clear to auscultation bilaterally.  Normal respiratory effort.   HEART:  Regular rate and rhythm.   S1 and S2 auscultated.  No murmur.  ABDOMEN:  Soft, nondistended.  No tenderness.  Positive bowel sounds.  EXTREMITIES:  No peripheral edema, clubbing, or cyanosis.  Right hip Prevena wound VAC dressing.  NEUROLOGIC:  Decreased sensation and muscle movement both lower extremities post spinal block.  No other focal finding.    ASSESSMENT AND PLAN:    1.   Right hip primary osteoarthritis status post right total hip arthroplasty, spinal block.  Pain control.  Neurovascular checks.  Aspirin for DVT prophylaxis.  Physical and occupational therapy.  2.   Essential hypertension.  Continue home medications and monitor.    Dictated By Meme Matthews MD  d: 05/28/2024 10:42:05  t: 05/28/2024 10:48:45  Job [id number]  FB/

## 2024-05-28 NOTE — INTERVAL H&P NOTE
Pre-op Diagnosis: Primary osteoarthritis of right hip [496208]    The above referenced H&P was reviewed by LATANYA HA MD on 5/28/2024, the patient was examined and no significant changes have occurred in the patient's condition since the H&P was performed.  I discussed with the patient and/or legal representative the potential benefits, risks and side effects of this procedure; the likelihood of the patient achieving goals; and potential problems that might occur during recuperation.  I discussed reasonable alternatives to the procedure, including risks, benefits and side effects related to the alternatives and risks related to not receiving this procedure.  We will proceed with procedure as planned.

## 2024-05-28 NOTE — CM/SW NOTE
CM requested department  (DSC) to initiate [facility] referral for HHC f2f entered. SW/CM will continue to follow.

## 2024-05-28 NOTE — PHYSICAL THERAPY NOTE
Orders received, chart reviewed. Spoke with JANE Monique, pt receiving TXA and is dizzy, will reattempt later as able.

## 2024-05-29 ENCOUNTER — APPOINTMENT (OUTPATIENT)
Dept: CV DIAGNOSTICS | Facility: HOSPITAL | Age: 64
DRG: 470 | End: 2024-05-29
Attending: HOSPITALIST
Payer: COMMERCIAL

## 2024-05-29 ENCOUNTER — TELEPHONE (OUTPATIENT)
Dept: ORTHOPEDICS CLINIC | Facility: CLINIC | Age: 64
End: 2024-05-29

## 2024-05-29 PROBLEM — Z01.818 PRE-OP TESTING: Status: ACTIVE | Noted: 2024-05-29

## 2024-05-29 LAB
ANION GAP SERPL CALC-SCNC: 3 MMOL/L (ref 0–18)
ATRIAL RATE: 70 BPM
BUN BLD-MCNC: 15 MG/DL (ref 9–23)
BUN/CREAT SERPL: 15.6 (ref 10–20)
CALCIUM BLD-MCNC: 8.2 MG/DL (ref 8.7–10.4)
CHLORIDE SERPL-SCNC: 105 MMOL/L (ref 98–112)
CO2 SERPL-SCNC: 28 MMOL/L (ref 21–32)
CREAT BLD-MCNC: 0.96 MG/DL
DEPRECATED RDW RBC AUTO: 43.2 FL (ref 35.1–46.3)
EGFRCR SERPLBLD CKD-EPI 2021: 88 ML/MIN/1.73M2 (ref 60–?)
ERYTHROCYTE [DISTWIDTH] IN BLOOD BY AUTOMATED COUNT: 13.3 % (ref 11–15)
GLUCOSE BLD-MCNC: 153 MG/DL (ref 70–99)
HCT VFR BLD AUTO: 25.1 %
HGB BLD-MCNC: 8.4 G/DL
MCH RBC QN AUTO: 29.7 PG (ref 26–34)
MCHC RBC AUTO-ENTMCNC: 33.5 G/DL (ref 31–37)
MCV RBC AUTO: 88.7 FL
OSMOLALITY SERPL CALC.SUM OF ELEC: 286 MOSM/KG (ref 275–295)
P AXIS: 37 DEGREES
P-R INTERVAL: 160 MS
PLATELET # BLD AUTO: 180 10(3)UL (ref 150–450)
POTASSIUM SERPL-SCNC: 4.1 MMOL/L (ref 3.5–5.1)
Q-T INTERVAL: 370 MS
QRS DURATION: 84 MS
QTC CALCULATION (BEZET): 399 MS
R AXIS: 31 DEGREES
RBC # BLD AUTO: 2.83 X10(6)UL
SODIUM SERPL-SCNC: 136 MMOL/L (ref 136–145)
T AXIS: 75 DEGREES
TSI SER-ACNC: 2.31 MIU/ML (ref 0.55–4.78)
VENTRICULAR RATE: 70 BPM
WBC # BLD AUTO: 6.9 X10(3) UL (ref 4–11)

## 2024-05-29 PROCEDURE — 93306 TTE W/DOPPLER COMPLETE: CPT | Performed by: HOSPITALIST

## 2024-05-29 PROCEDURE — 99233 SBSQ HOSP IP/OBS HIGH 50: CPT | Performed by: HOSPITALIST

## 2024-05-29 NOTE — PHYSICAL THERAPY NOTE
PHYSICAL THERAPY HIP TREATMENT NOTE - INPATIENT    Room Number: 426/426-A            Presenting Problem: s/p R FAVIO, anterior approach on        Problem List  Principal Problem:    Primary osteoarthritis of right hip  Active Problems:    Essential hypertension    Pre-op testing      PHYSICAL THERAPY ASSESSMENT   Patient demonstrates good  progress this session, goals  remain in progress.    Patient continues to function below baseline with bed mobility, transfers, and gait.  Contributing factors to remaining limitations include decreased functional strength, decreased endurance/aerobic capacity, and pain.  Next session anticipate patient to progress bed mobility, transfers, and gait.  Physical Therapy will continue to follow patient for duration of hospitalization.    Patient continues to benefit from continued skilled PT services: at discharge to promote functional independence in home.  Anticipate patient will return home with home health PT.    PLAN  PT Treatment Plan: Bed mobility;Body mechanics;Endurance  Frequency (Obs): BID    SUBJECTIVE  Pt reports being ready for PT RX    OBJECTIVE  Precautions: FAVIO - anterior    WEIGHT BEARING RESTRICTION        R Lower Extremity: Weight Bearing as Tolerated       PAIN ASSESSMENT   Ratin  Location: right hip  Management Techniques: Activity promotion;Body mechanics;Repositioning    BALANCE  Static Sitting: Good  Dynamic Sitting: Fair +  Static Standing: Fair  Dynamic Standing: Fair -  ACTIVITY TOLERANCE                         O2 WALK       AM-PAC '6-Clicks' INPATIENT SHORT FORM - BASIC MOBILITY  How much difficulty does the patient currently have...  Patient Difficulty: Turning over in bed (including adjusting bedclothes, sheets and blankets)?: A Little   Patient Difficulty: Sitting down on and standing up from a chair with arms (e.g., wheelchair, bedside commode, etc.): A Little   Patient Difficulty: Moving from lying on back to sitting on the side of the bed?: A  Little   How much help from another person does the patient currently need...   Help from Another: Moving to and from a bed to a chair (including a wheelchair)?: A Little   Help from Another: Need to walk in hospital room?: A Little   Help from Another: Climbing 3-5 steps with a railing?: A Little     AM-PAC Score:  Raw Score: 18   Approx Degree of Impairment: 46.58%   Standardized Score (AM-PAC Scale): 43.63   CMS Modifier (G-Code): CK    FUNCTIONAL ABILITY STATUS  Functional Mobility/Gait Assessment  Gait Assistance: Contact guard assist  Distance (ft): 2 x 40  Assistive Device: Rolling walker  Pattern: Shuffle;R Decreased stance time  Rolling: minimal assist  Supine to Sit: minimal assist  Sit to Supine: minimal assist  Sit to Stand: minimal assist    Additional Information: AM session.Min a for bed mobility and transfer.Extra time provided to complete task.There ex.EOB sitting balance activity with emphasis on core stabilization.Pt amb 2 x 40 ft with RW and CGA;c/o dizziness;let him sat in chair;wheeled  to his room;transfer to supine in bed.RR called    The patient's Approx Degree of Impairment: 46.58% has been calculated based on documentation in the [facility] '6 clicks' Inpatient Basic Mobility Short Form.  Research supports that patients with this level of impairment may benefit from Home with Home Health PT.  Final disposition will be made by interdisciplinary medical team.    Exercises AM Session PM Session   Ankle Pumps     20 reps  reps   Quad Sets 20 reps  reps   Glut Sets 20 reps  reps                                               Patient End of Session: Up in chair;Call light within reach;RN aware of session/findings;All patient questions and concerns addressed    CURRENT GOALS   Patient Goal Patient's self-stated goal is: go home   Goal #1 Patient is able to demonstrate supine - sit EOB @ level: modified independent   Goal #1   Current Status Min a   Goal #2 Patient is able to demonstrate transfers Sit  to/from Stand at assistance level: supervision     Goal #2  Current Status Min a   Goal #3 Patient is able to ambulate 150 feet with assistive device at assistance level: supervision   Goal #3   Current Status Pt amb 2 x 40 ft with RW and CGA   Goal #4 Patient will negotiate 5 stairs/one curb w/ assistive device and supervision   Goal #4   Current Status NT   Goal #5 Patient verbalizes and/or demonstrates all precautions and safety concerns independently   Goal #5   Current Status In progress   Goal #6 Patient independently performs home exercise program for ROM/strengthening per the instructions provided in preparation for discharge.   Goal #6  Current Status In progress     Gait Training: 15 minutes  Therapeutic Activity: 15 minutes  Neuromuscular Re-education:  minutes  Therapeutic Exercise:  minutes  Canalith Repositioning:  minutes  Manual Therapy:  minutes  Can add/delete any of these

## 2024-05-29 NOTE — OCCUPATIONAL THERAPY NOTE
OCCUPATIONAL THERAPY EVALUATION - INPATIENT     Room Number: 426/426-A  Evaluation Date: 5/29/2024  Type of Evaluation: Quick Eval  Presenting Problem: s/p R FAVIO (anterior) by Dr. Negro 5/28    Physician Order: IP Consult to Occupational Therapy  Reason for Therapy: ADL/IADL Dysfunction and Discharge Planning    OCCUPATIONAL THERAPY ASSESSMENT   Patient is a 64 year old male admitted 5/28/2024 for s/p R FAVIO (anterior) by Dr. Negro, seen POD 1.  Prior to admission, patient's baseline is independent with I/ADLs and fx mobility/transfers without a device.  Patient is currently functioning near baseline with  ADLs and fx mobility/transfers . Patient stated he had no concerns r/t ability to perform self-cares upon discharge; will have assist as needed. Anticipate patient will be able to safely discharge home with additional support when medically cleared.    PLAN  Patient has been evaluated and presents with no skilled Occupational Therapy needs  at this time.  Patient will be discharged from Occupational Therapy services. Please re-order if a new functional limitation presents during this admission.     OCCUPATIONAL THERAPY MEDICAL/SOCIAL HISTORY   Problem List  Principal Problem:    Primary osteoarthritis of right hip  Active Problems:    Essential hypertension    Pre-op testing    HOME SITUATION  Type of Home: House  Home Layout: Two level  Lives With: Spouse  Toilet and Equipment: Comfort height toilet  Shower/Tub and Equipment: Walk-in shower; Shower chair; Tub-shower combo  Drives: Yes  Patient Regularly Uses: None  Stairs in Home: 2 VIOLET, 5 steps upstairs with railing  Use of Assistive Device(s): Owns RW/cane    SUBJECTIVE  \"I feel a little more sore than yesterday.\"    OCCUPATIONAL THERAPY EXAMINATION    OBJECTIVE  Precautions: FAVIO - anterior  Fall Risk: Standard fall risk    WEIGHT BEARING RESTRICTION  R Lower Extremity: Weight Bearing as Tolerated    PAIN ASSESSMENT  Rating: -- (not rated)  Location:  soreness in R hip  Management Techniques: Activity promotion; Body mechanics; Repositioning    ACTIVITY TOLERANCE  Pulse: 76  Heart Rate Source: Monitor     BP: 125/75  BP Location: Right arm  BP Method: Automatic  Patient Position: Sitting    O2 SATURATIONS  Oxygen Therapy  SPO2% Ambulation on Room Air: 100    COGNITION  Overall Cognitive Status:  WFL - within functional limits    SENSATION  Light touch:  intact    RANGE OF MOTION   Upper extremity ROM is within functional limits     STRENGTH ASSESSMENT  Upper extremity strength is within functional limits     COORDINATION  Gross Motor: WFL  Fine Motor: WFL     ACTIVITIES OF DAILY LIVING ASSESSMENT  AM-PAC ‘6-Clicks’ Inpatient Daily Activity Short Form  How much help from another person does the patient currently need…  -   Putting on and taking off regular lower body clothing?: A Little  -   Bathing (including washing, rinsing, drying)?: A Little  -   Toileting, which includes using toilet, bedpan or urinal? : A Little  -   Putting on and taking off regular upper body clothing?: None  -   Taking care of personal grooming such as brushing teeth?: None  -   Eating meals?: None    AM-PAC Score:  Score: 21  Approx Degree of Impairment: 32.79%  Standardized Score (AM-PAC Scale): 44.27  CMS Modifier (G-Code): CJ    BED MOBILITY  Supine to Sit: Independent     FUNCTIONAL TRANSFER ASSESSMENT  Sit to Stand from EOB: SUP  Chair Transfer: SUP    FUNCTIONAL MOBILITY  SUP for hallway fx mobility using RW    FUNCTIONAL ADL ASSESSMENT  Eating: independent (per obs)   Grooming: supervision   UB Dressing: independent (per obs)   LB Dressing: supervision  Toileting: supervision (per obs)     EDUCATION PROVIDED  Patient: Role of Occupational Therapy; Plan of Care; Discharge Recommendations; Functional Transfer Techniques; Fall Prevention; Posture/Positioning; Energy Conservation; Compensatory ADL Techniques; Proper Body Mechanics; Weight Bear Status  Patient's Response to Education:  Verbalized Understanding; Returned Demonstration    The patient's Approx Degree of Impairment: 32.79% has been calculated based on documentation in the [facility] '6 clicks' Inpatient Daily Activity Short Form.  Research supports that patients with this level of impairment may benefit from discharge home without .  Final disposition will be made by interdisciplinary medical team.    Patient End of Session: Up in chair;Needs met;Call light within reach;RN aware of session/findings;With  staff;All patient questions and concerns addressed;Family present    Patient was able to achieve the following ...   Patient able to toilet transfer   SUP    Patient able to dress lower extremities   SUP    Patient/Caregiver able to demonstrate safety with ADLS  at previous functional level     Patient Evaluation Complexity Level:   Occupational Profile/Medical History LOW - Brief history including review of medical or therapy records    Specific performance deficits impacting engagement in ADL/IADL LOW  1 - 3 performance deficits    Client Assessment/Performance Deficits LOW - No comorbidities nor modifications of tasks    Clinical Decision Making LOW - Analysis of occupational profile, problem-focused assessments, limited treatment options    Overall Complexity LOW     OT Session Time  Therapeutic Activity: 12 minutes    MATTHEW Bolivar/L  [facility]  #[id number]

## 2024-05-29 NOTE — PHYSICAL THERAPY NOTE
PHYSICAL THERAPY HIP TREATMENT NOTE - INPATIENT    Room Number: 426/426-A            Presenting Problem: s/p R FAVIO, anterior approach on        Problem List  Principal Problem:    Primary osteoarthritis of right hip  Active Problems:    Essential hypertension    Pre-op testing      PHYSICAL THERAPY ASSESSMENT   Patient demonstrates good  progress this session, goals  remain in progress.    Patient continues to function below baseline with bed mobility, transfers, and gait.  Contributing factors to remaining limitations include decreased functional strength, decreased endurance/aerobic capacity, and pain.  Next session anticipate patient to progress bed mobility, transfers, and gait.  Physical Therapy will continue to follow patient for duration of hospitalization.    Patient continues to benefit from continued skilled PT services: at discharge to promote functional independence in home.  Anticipate patient will return home with home health PT.    PLAN  PT Treatment Plan: Bed mobility;Coordination;Endurance;Patient education  Frequency (Obs): Daily    SUBJECTIVE  Pt reports being ready for PT RX    OBJECTIVE  Precautions: FAVIO - anterior    WEIGHT BEARING RESTRICTION        R Lower Extremity: Weight Bearing as Tolerated       PAIN ASSESSMENT   Ratin  Location: right hip  Management Techniques: Activity promotion;Body mechanics;Repositioning    BALANCE  Static Sitting: Good  Dynamic Sitting: Fair +  Static Standing: Fair  Dynamic Standing: Fair -  ACTIVITY TOLERANCE                         O2 WALK       AM-PAC '6-Clicks' INPATIENT SHORT FORM - BASIC MOBILITY  How much difficulty does the patient currently have...  Patient Difficulty: Turning over in bed (including adjusting bedclothes, sheets and blankets)?: A Little   Patient Difficulty: Sitting down on and standing up from a chair with arms (e.g., wheelchair, bedside commode, etc.): A Little   Patient Difficulty: Moving from lying on back to sitting on the  side of the bed?: A Little   How much help from another person does the patient currently need...   Help from Another: Moving to and from a bed to a chair (including a wheelchair)?: A Little   Help from Another: Need to walk in hospital room?: A Little   Help from Another: Climbing 3-5 steps with a railing?: A Little     AM-PAC Score:  Raw Score: 18   Approx Degree of Impairment: 46.58%   Standardized Score (AM-PAC Scale): 43.63   CMS Modifier (G-Code): CK    FUNCTIONAL ABILITY STATUS  Functional Mobility/Gait Assessment  Gait Assistance: Contact guard assist  Distance (ft): 2 x 75  Assistive Device: Rolling walker  Pattern: R Decreased stance time;Shuffle  Rolling: minimal assist  Supine to Sit: minimal assist  Sit to Supine: minimal assist  Sit to Stand: minimal assist    Additional Information: PM session.Min a for bed mobility and transfer.Extra time provided to complete task.There ex.EOB sitting balance activity with emphasis on core stabilization.Pt amb 2 x 75  ft with RW and CGA; provided cuing for gait pattern as well as for postural awareness;No c/o dizziness.Provided cuing for gait pattern as well as for postural awareness.Family present;family education;all questions and concerns addressed.    The patient's Approx Degree of Impairment: 46.58% has been calculated based on documentation in the [facility] '6 clicks' Inpatient Basic Mobility Short Form.  Research supports that patients with this level of impairment may benefit from Home with Home Health PT.  Final disposition will be made by interdisciplinary medical team.    Exercises AM Session PM Session   Ankle Pumps     20 reps 20 reps   Quad Sets 20 reps 20  reps   Glut Sets 20 reps 20  reps                                               Patient End of Session: Up in chair;Call light within reach;RN aware of session/findings;All patient questions and concerns addressed    CURRENT GOALS   Patient Goal Patient's self-stated goal is: go home   Goal #1 Patient is  able to demonstrate supine - sit EOB @ level: modified independent   Goal #1   Current Status Min a   Goal #2 Patient is able to demonstrate transfers Sit to/from Stand at assistance level: supervision     Goal #2  Current Status Min a   Goal #3 Patient is able to ambulate 150 feet with assistive device at assistance level: supervision   Goal #3   Current Status Pt amb 2 x 75 ft with RW and CGA   Goal #4 Patient will negotiate 5 stairs/one curb w/ assistive device and supervision   Goal #4   Current Status NT   Goal #5 Patient verbalizes and/or demonstrates all precautions and safety concerns independently   Goal #5   Current Status In progress   Goal #6 Patient independently performs home exercise program for ROM/strengthening per the instructions provided in preparation for discharge.   Goal #6  Current Status In progress     Gait Training: 15 minutes  Therapeutic Activity: 15 minutes  Neuromuscular Re-education:  minutes  Therapeutic Exercise:  minutes  Canalith Repositioning:  minutes  Manual Therapy:  minutes  Can add/delete any of these

## 2024-05-29 NOTE — PROGRESS NOTES
[facility]  part of [facility]    Progress Note    Saturnino Patterson Patient Status:  Outpatient in a Bed    1960 MRN [id number]   Location [facility] 4W/SW/SE Attending Magalys Hamilton MD   Hosp Day # 0 PCP Rowdy Lang,        Subjective:   Saturnino Patterson is a(n) 64 year old male postop day 1 status post right total hip arthroplasty via anterior approach.  Became lightheaded this morning during physical therapy.  When I evaluated him he was sitting up in the chair next to his bed alert and conversant.  He has a little bit more pain in the hip today than yesterday no complaints of severe pain or lightheadedness.    Objective:   Blood pressure 125/75, pulse 76, temperature 98.6 °F (37 °C), temperature source Oral, resp. rate 18, height 5' 10\" (1.778 m), weight 155 lb (70.3 kg), SpO2 98%.    General appearance: alert, appears stated age, and cooperative  INCISION/WOUND: dressing intact and in place  Extremities: no edema, redness or tenderness in the calves or thighs and plantarflexion and dorsiflexion of the feet  Ortho Exam    Assessment and Plan:     Primary osteoarthritis of right hip  D physical therapy.  Workup for possible syncopal episode.  Possible DC home tomorrow if he is medically cleared and passes physical therapy.      Essential hypertension        Pre-op testing          Results:     Lab Results   Component Value Date    WBC 6.9 2024    HGB 8.4 (L) 2024    HCT 25.1 (L) 2024    .0 2024    CREATSERUM 0.96 2024    BUN 15 2024     2024    K 4.1 2024     2024    CO2 28.0 2024     (H) 2024    CA 8.2 (L) 2024    ALB 4.3 2024    ALKPHO 82 2024    BILT 0.8 2024    TP 7.1 2024    AST 21 2024    ALT 21 2024    TSH 2.311 2024       XR HIP W OR WO PELVIS 2 OR 3 VIEWS, RIGHT (CPT=73502)    Result Date: 2024  CONCLUSION: Postsurgical  changes of right hip arthroplasty.  Dictated by (CST): Rajinder Cervantes MD on 5/28/2024 at 10:58 AM     Finalized by (CST): Rajinder Cervantes MD on 5/28/2024 at 11:00 AM          XR FLUOROSCOPY C-ARM TIME LESS THAN 1 HOUR (CPT=76000)    Result Date: 5/28/2024  CONCLUSION: Fluoroscopic guidance as above.  24.8-seconds of fluoroscopy time were used. 2 fluoroscopic images as well as a 1 page-dose summary image are stored with this exam.  RADIATION DOSE (Dose Area Product):  0.98846-lOd*m^2.   Dictated by (CST): Rajinder Cervantes MD on 5/28/2024 at 10:35 AM     Finalized by (CST): Rajinder Cervantes MD on 5/28/2024 at 10:35 AM         EKG 12 Lead    Result Date: 5/29/2024  Normal sinus rhythm Minimal voltage criteria for LVH, may be normal variant ( Sokolow-Su ) Nonspecific T wave abnormality Abnormal ECG When compared with ECG of 14-MAY-2024 16:27, No significant change was found       LATANYA HA MD  5/29/2024

## 2024-05-29 NOTE — HOME CARE LIAISON
Referral received from SW/CM team via Aidin. Discussed with patient's wife Arelis the recommendation for home health services, wife agreeable, and all questions answered. Updated CM Rebeca RUBIO

## 2024-05-29 NOTE — PROGRESS NOTES
Progress Note     Saturnino Patterson Patient Status:  Outpatient in a Bed    1960 MRN [id number]   Location [facility] 4W/SW/SE Attending Caesar Negro, *   Hosp Day # 0 PCP Rowdy Lang DO     Chief Complaint: rt. Hip oa    Subjective:   S: Patient here for rt. Total hip arthroplasty  This am, RRT called for syncopal episode   Walking with PT, walked about ~100 ft and then became light headed, assisted into chair.      Review of Systems:   10 point ROS completed and was negative, except for pertinent positive and negatives stated in subjective.    Objective:   Vital signs:  Temp:  [97.6 °F (36.4 °C)-98.9 °F (37.2 °C)] 98.6 °F (37 °C)  Pulse:  [46-88] 75  Resp:  [11-18] 18  BP: (104-181)/(67-84) 128/75  SpO2:  [95 %-100 %] 100 %    Wt Readings from Last 6 Encounters:   24 155 lb (70.3 kg)   24 151 lb (68.5 kg)   24 155 lb (70.3 kg)   24 151 lb (68.5 kg)   24 151 lb (68.5 kg)   23 150 lb (68 kg)         Physical Exam:    General: No acute distress. Alert ,         Respiratory: Clear to auscultation bilaterally. No wheezes. No rhonchi.  Cardiovascular: S1, S2. Regular rate and rhythm. No murmurs, rubs or gallops.   Abdomen: Soft, nontender, nondistended.  Positive bowel sounds. No rebound or guarding.  Neurologic: No focal neurological deficits.   Musculoskeletal: Moves all extremities.  Extremities: No edema.    Results:   Diagnostic Data:      Labs:    Labs Last 24 Hours:   BMP     CBC    Other     Na - Cl - BUN - Glu -   Hb 11.0   PTT - Procal -   K - CO2 - Cr -   WBC 5.8 >< .0  INR - CRP -   Renal Lytes Endo    Hct 33.7   Trop - D dim -   eGFR - Ca - POC Gluc  150    LFT   pBNP - Lactic -   eGFR AA - PO4 - A1c -   AST - APk - Prot -  LDL -     Mg - TSH -   ALT - T diego - Alb -        COVID-19 Lab Results    COVID-19  No results found for: \"COVID19\"    Pro-Calcitonin  No results for input(s): \"PCT\" in the last 168 hours.    Cardiac  No results for  input(s): \"TROP\", \"PBNP\" in the last 168 hours.    Creatinine Kinase  No results for input(s): \"CK\" in the last 168 hours.    Inflammatory Markers  No results for input(s): \"CRP\", \"KULDEEP\", \"LDH\", \"DDIMER\" in the last 168 hours.    Imaging: Imaging data reviewed in Epic.    Medications:    sennosides  17.2 mg Oral Nightly    docusate sodium  100 mg Oral BID    aspirin  325 mg Oral BID    famotidine  20 mg Oral BID    Or    famotidine  20 mg Intravenous BID    ferrous sulfate  325 mg Oral Daily with breakfast    PARoxetine  30 mg Oral Daily    losartan  25 mg Oral Daily       Assessment & Plan:   ASSESSMENT / PLAN:     #Syncope  -while walking with PT  -had a syncopal episode yesterday as well  -vitals stable  -check am labs  -had 2 pauses on tele  -check echocardiogram  -cont to monitor on tele  -cardiology consultation    Rt. Hip OA  -s/p rt. Total hip arthroplasty  -pod # 1  -pain control  -asa for dvt prophylaxis    HTN  -cont cozaar    Acute Post operative blood loss anemia  -hb 14.9 prior to surgery  -hb 11 post op  -follow-up hb from today  -no indications for blood transfusion      Quality:  DVT Prophylaxis: asa bid  CODE status: full  Guan:    Central line: n/a  Dispo: further recs pending clinical course      Will the patient be referred to [facility] on discharge?: yes  Estimated date of discharge: TBD  Discharge is dependent on: post op course  At this point Mr. Patterson is expected to be discharge to: home    Plan of care discussed with patient, nursing    Outpatient records or previous hospital records reviewed.   Patient and/or patient's family given opportunity to ask questions and note understanding and agreeing with therapeutic plan as outlined     Coordinated care with providers and counseling re: treatment plan and workup    MDM: High complexity     JAMISON COLLINS MD    Supplementary Documentation:

## 2024-05-29 NOTE — CM/SW NOTE
CM f/u with pt at bedside for HH choice. Pt agrees to use [facility] on dc.   [facility] res via Aidin. Alejandra Liaison notified of choice via secure chat.     [facility]  P:[phone]  F:[phone]    Rebeca Dodge RN, BSN  Nurse   [phone]

## 2024-05-29 NOTE — PLAN OF CARE
Up with therapy in monge, felt lightheaded, sat in chair, passed out, wheeled back to room, assisted back to bed with 3 assist, hemovac drain inadvertently removed; Per tele pauses 6.19 seconds and 5.70 seconds at that time and Dr Hamilton at bedside informed of this.  IV infusing for now, 100% room air. Zofran given for nausea. Orders received.     Message left for on call MD for Dr Negro to report passed out with therapy and Hemovac drain inadvertently removed. Kev Perkins aware.     [facility] is dc plan.    Jenny called twice for EKG. Still waiting for EKG.     EKG completed by manager.     Up in monge with therapy. No complaints of dizziness.     2 D Echo completed.     Norco for pain. Dc plan is HH.         Problem: Patient Centered Care  Goal: Patient preferences are identified and integrated in the patient's plan of care  Description: Interventions:  - What would you like us to know as we care for you? Patient is from home with wife who is an employee here and she's his support system.  - Provide timely, complete, and accurate information to patient/family  - Incorporate patient and family knowledge, values, beliefs, and cultural backgrounds into the planning and delivery of care  - Encourage patient/family to participate in care and decision-making at the level they choose  - Honor patient and family perspectives and choices  Outcome: Progressing     Problem: Patient/Family Goals  Goal: Patient/Family Long Term Goal  Description: Patient's Long Term Goal: Patient will be able to ambulate well and will have no complications from surgery.    Interventions:  - Up as tolerated with walker, WBAT to right leg.  - Maintain hip precautions as instructed.  - Monitor incision for any signs of infection and bleeding.  - Pain management with oral medication.  - Take oral medication for anticoagulation to prevent blood clots.  - May ice incision to prevent swelling or help reduce pain.  - Home health PT at home.  - See  additional Care Plan goals for specific interventions  Outcome: Progressing  Goal: Patient/Family Short Term Goal  Description: Patient's Short Term Goal: Home with [facility] when passes PT    Interventions:   - Up as tolerated with walker, WBAT to right leg.  - Maintain hip precautions as instructed.  - Monitor incision for any signs of infection and bleeding.  - Pain management with oral medication.  - SCD's to both legs and administer oral medication for anticoagulation to prevent blood clots.  - May ice incision to prevent swelling or help reduce pain.  - PT/OT as ordered.  - See additional Care Plan goals for specific interventions  Outcome: Progressing     Problem: PAIN - ADULT  Goal: Verbalizes/displays adequate comfort level or patient's stated pain goal  Description: INTERVENTIONS:  - Encourage pt to monitor pain and request assistance  - Assess pain using appropriate pain scale  - Administer analgesics based on type and severity of pain and evaluate response  - Implement non-pharmacological measures as appropriate and evaluate response  - Consider cultural and social influences on pain and pain management  - Manage/alleviate anxiety  - Utilize distraction and/or relaxation techniques  - Monitor for opioid side effects  - Notify MD/LIP if interventions unsuccessful or patient reports new pain  - Anticipate increased pain with activity and pre-medicate as appropriate  Outcome: Progressing     Problem: RISK FOR INFECTION - ADULT  Goal: Absence of fever/infection during anticipated neutropenic period  Description: INTERVENTIONS  - Monitor WBC  - Administer growth factors as ordered  - Implement neutropenic guidelines  Outcome: Progressing     Problem: SAFETY ADULT - FALL  Goal: Free from fall injury  Description: INTERVENTIONS:  - Assess pt frequently for physical needs  - Identify cognitive and physical deficits and behaviors that affect risk of falls.  - [place] fall precautions as indicated by assessment.  -  Educate pt/family on patient safety including physical limitations  - Instruct pt to call for assistance with activity based on assessment  - Modify environment to reduce risk of injury  - Provide assistive devices as appropriate  - Consider OT/PT consult to assist with strengthening/mobility  - Encourage toileting schedule  Outcome: Progressing     Problem: DISCHARGE PLANNING  Goal: Discharge to home or other facility with appropriate resources  Description: INTERVENTIONS:  - Identify barriers to discharge w/pt and caregiver  - Include patient/family/discharge partner in discharge planning  - Arrange for needed discharge resources and transportation as appropriate  - Identify discharge learning needs (meds, wound care, etc)  - Arrange for interpreters to assist at discharge as needed  - Consider post-discharge preferences of patient/family/discharge partner  - Complete POLST form as appropriate  - Assess patient's ability to be responsible for managing their own health  - Refer to Case Management Department for coordinating discharge planning if the patient needs post-hospital services based on physician/LIP order or complex needs related to functional status, cognitive ability or social support system  Outcome: Progressing     Problem: GENITOURINARY - ADULT  Goal: Absence of urinary retention  Description: INTERVENTIONS:  - Assess patient’s ability to void and empty bladder  - Monitor intake/output and perform bladder scan as needed  - Follow urinary retention protocol/standard of care  - Consider collaborating with pharmacy to review patient's medication profile  - Implement strategies to promote bladder emptying  Outcome: Progressing     Problem: SKIN/TISSUE INTEGRITY - ADULT  Goal: Incision(s), wounds(s) or drain site(s) healing without S/S of infection  Description: INTERVENTIONS:  - Assess and document risk factors for pressure ulcer development  - Assess and document skin integrity  - Assess and document  dressing/incision, wound bed, drain sites and surrounding tissue  - Implement wound care per orders  - Initiate isolation precautions as appropriate  - Initiate Pressure Ulcer prevention bundle as indicated  Outcome: Progressing     Problem: MUSCULOSKELETAL - ADULT  Goal: Return mobility to safest level of function  Description: INTERVENTIONS:  - Assess patient stability and activity tolerance for standing, transferring and ambulating w/ or w/o assistive devices  - Assist with transfers and ambulation using safe patient handling equipment as needed  - Ensure adequate protection for wounds/incisions during mobilization  - Obtain PT/OT consults as needed  - Advance activity as appropriate  - Communicate ordered activity level and limitations with patient/family  Outcome: Progressing  Goal: Maintain proper alignment of affected body part  Description: INTERVENTIONS:  - Support and protect limb and body alignment per provider's orders  - Instruct and reinforce with patient and family use of appropriate assistive device and precautions (e.g. spinal or hip dislocation precautions)  Outcome: Progressing

## 2024-05-29 NOTE — SPIRITUAL CARE NOTE
Spiritual Care Visit Note    Patient Name: Saturnino Patterson Date of Spiritual Care Visit: 24   : 1960 Primary Dx: Primary osteoarthritis of right hip       Referred By: Referral From:     Spiritual Care Taxonomy:    Intended Effects: Demonstrate caring and concern    Methods: Offer emotional support    Interventions: Active listening;Ask guided questions    Visit Type/Summary:    I met with patient and his wife was bedside.  Patient shared his difficulty with the uncertainty of his health and the scares of recent events.  We talked about ways he's lived through other difficult times.   remains available.    Chaplain Gutierrez [phone]

## 2024-05-29 NOTE — DISCHARGE INSTRUCTIONS
Home care nurse to remove wound vac dressing on Tuesday.  Apply Mepilex dressing.  Change dressing every 3-5 days.  Keep wound dry for 2 weeks.  Ambulate with walker and assist    Sometimes managing your health at home requires assistance.  The [facility] team has recognized your preference to use [facility].  They can be reached by phone at [phone].  The fax number for your reference is [phone].  A representative from the home health agency will contact you or your family to schedule your first visit.      Follow-up with PCP in 2 to 3 weeks after surgery  Monitor blood pressure every day with a blood pressure monitor once blood pressure is consistently over 150 systolic (top) number then resume your home blood pressure medications  You will need repeat echocardiogram for incidental finding of mildly dilated aortic root with pcp  Stay hydrated  Follow-up with orthopedics as instructed

## 2024-05-29 NOTE — PLAN OF CARE
Patient has safety precautions in place bed in the lowest position, bed alarm on, and call light within reach. Plan of care ongoing. No further concerns as of present.    Problem: Patient Centered Care  Goal: Patient preferences are identified and integrated in the patient's plan of care  Description: Interventions:  - What would you like us to know as we care for you? Patient is from home with wife who is an employee here and she's his support system.  - Provide timely, complete, and accurate information to patient/family  - Incorporate patient and family knowledge, values, beliefs, and cultural backgrounds into the planning and delivery of care  - Encourage patient/family to participate in care and decision-making at the level they choose  - Honor patient and family perspectives and choices  Outcome: Progressing     Problem: Patient/Family Goals  Goal: Patient/Family Long Term Goal  Description: Patient's Long Term Goal: Patient will be able to ambulate well and will have no complications from surgery.    Interventions:  - Up as tolerated with walker, WBAT to right leg.  - Maintain hip precautions as instructed.  - Monitor incision for any signs of infection and bleeding.  - Pain management with oral medication.  - Take oral medication for anticoagulation to prevent blood clots.  - May ice incision to prevent swelling or help reduce pain.  - Home health PT at home.  - See additional Care Plan goals for specific interventions  Outcome: Progressing  Goal: Patient/Family Short Term Goal  Description: Patient's Short Term Goal: Home with [facility] when passes PT    Interventions:   - Up as tolerated with walker, WBAT to right leg.  - Maintain hip precautions as instructed.  - Monitor incision for any signs of infection and bleeding.  - Pain management with oral medication.  - SCD's to both legs and administer oral medication for anticoagulation to prevent blood clots.  - May ice incision to prevent swelling or help reduce  pain.  - PT/OT as ordered.  - See additional Care Plan goals for specific interventions  Outcome: Progressing     Problem: PAIN - ADULT  Goal: Verbalizes/displays adequate comfort level or patient's stated pain goal  Description: INTERVENTIONS:  - Encourage pt to monitor pain and request assistance  - Assess pain using appropriate pain scale  - Administer analgesics based on type and severity of pain and evaluate response  - Implement non-pharmacological measures as appropriate and evaluate response  - Consider cultural and social influences on pain and pain management  - Manage/alleviate anxiety  - Utilize distraction and/or relaxation techniques  - Monitor for opioid side effects  - Notify MD/LIP if interventions unsuccessful or patient reports new pain  - Anticipate increased pain with activity and pre-medicate as appropriate  Outcome: Progressing     Problem: RISK FOR INFECTION - ADULT  Goal: Absence of fever/infection during anticipated neutropenic period  Description: INTERVENTIONS  - Monitor WBC  - Administer growth factors as ordered  - Implement neutropenic guidelines  Outcome: Progressing     Problem: SAFETY ADULT - FALL  Goal: Free from fall injury  Description: INTERVENTIONS:  - Assess pt frequently for physical needs  - Identify cognitive and physical deficits and behaviors that affect risk of falls.  - [place] fall precautions as indicated by assessment.  - Educate pt/family on patient safety including physical limitations  - Instruct pt to call for assistance with activity based on assessment  - Modify environment to reduce risk of injury  - Provide assistive devices as appropriate  - Consider OT/PT consult to assist with strengthening/mobility  - Encourage toileting schedule  Outcome: Progressing     Problem: DISCHARGE PLANNING  Goal: Discharge to home or other facility with appropriate resources  Description: INTERVENTIONS:  - Identify barriers to discharge w/pt and caregiver  - Include  patient/family/discharge partner in discharge planning  - Arrange for needed discharge resources and transportation as appropriate  - Identify discharge learning needs (meds, wound care, etc)  - Arrange for interpreters to assist at discharge as needed  - Consider post-discharge preferences of patient/family/discharge partner  - Complete POLST form as appropriate  - Assess patient's ability to be responsible for managing their own health  - Refer to Case Management Department for coordinating discharge planning if the patient needs post-hospital services based on physician/LIP order or complex needs related to functional status, cognitive ability or social support system  Outcome: Progressing     Problem: GENITOURINARY - ADULT  Goal: Absence of urinary retention  Description: INTERVENTIONS:  - Assess patient’s ability to void and empty bladder  - Monitor intake/output and perform bladder scan as needed  - Follow urinary retention protocol/standard of care  - Consider collaborating with pharmacy to review patient's medication profile  - Implement strategies to promote bladder emptying  Outcome: Progressing     Problem: SKIN/TISSUE INTEGRITY - ADULT  Goal: Incision(s), wounds(s) or drain site(s) healing without S/S of infection  Description: INTERVENTIONS:  - Assess and document risk factors for pressure ulcer development  - Assess and document skin integrity  - Assess and document dressing/incision, wound bed, drain sites and surrounding tissue  - Implement wound care per orders  - Initiate isolation precautions as appropriate  - Initiate Pressure Ulcer prevention bundle as indicated  Outcome: Progressing     Problem: MUSCULOSKELETAL - ADULT  Goal: Return mobility to safest level of function  Description: INTERVENTIONS:  - Assess patient stability and activity tolerance for standing, transferring and ambulating w/ or w/o assistive devices  - Assist with transfers and ambulation using safe patient handling equipment  as needed  - Ensure adequate protection for wounds/incisions during mobilization  - Obtain PT/OT consults as needed  - Advance activity as appropriate  - Communicate ordered activity level and limitations with patient/family  Outcome: Progressing  Goal: Maintain proper alignment of affected body part  Description: INTERVENTIONS:  - Support and protect limb and body alignment per provider's orders  - Instruct and reinforce with patient and family use of appropriate assistive device and precautions (e.g. spinal or hip dislocation precautions)  Outcome: Progressing

## 2024-05-30 VITALS
OXYGEN SATURATION: 95 % | BODY MASS INDEX: 22.19 KG/M2 | DIASTOLIC BLOOD PRESSURE: 60 MMHG | RESPIRATION RATE: 18 BRPM | WEIGHT: 155 LBS | HEART RATE: 90 BPM | TEMPERATURE: 98 F | SYSTOLIC BLOOD PRESSURE: 121 MMHG | HEIGHT: 70 IN

## 2024-05-30 PROBLEM — G89.18 POSTOPERATIVE PAIN: Status: ACTIVE | Noted: 2024-05-30

## 2024-05-30 LAB
HCT VFR BLD AUTO: 23.1 %
HGB BLD-MCNC: 8.1 G/DL

## 2024-05-30 PROCEDURE — 99239 HOSP IP/OBS DSCHRG MGMT >30: CPT | Performed by: HOSPITALIST

## 2024-05-30 RX ORDER — ASPIRIN 325 MG
325 TABLET, DELAYED RELEASE (ENTERIC COATED) ORAL 2 TIMES DAILY
Qty: 60 TABLET | Refills: 0 | Status: SHIPPED | OUTPATIENT
Start: 2024-05-30

## 2024-05-30 RX ORDER — HYDROCODONE BITARTRATE AND ACETAMINOPHEN 7.5; 325 MG/1; MG/1
1 TABLET ORAL EVERY 6 HOURS PRN
Qty: 40 TABLET | Refills: 0 | Status: SHIPPED | OUTPATIENT
Start: 2024-05-30

## 2024-05-30 RX ORDER — PSEUDOEPHEDRINE HCL 30 MG
100 TABLET ORAL 2 TIMES DAILY
Qty: 20 CAPSULE | Refills: 0 | Status: SHIPPED | OUTPATIENT
Start: 2024-05-30

## 2024-05-30 RX ORDER — MAGNESIUM OXIDE 400 MG (241.3 MG MAGNESIUM) TABLET
325 TABLET
Qty: 20 TABLET | Refills: 0 | Status: SHIPPED | OUTPATIENT
Start: 2024-05-30

## 2024-05-30 NOTE — PROGRESS NOTES
[facility]  part of [facility]    Progress Note    Saturnino Patterson Patient Status:  Inpatient    1960 MRN [id number]   Location [facility] 4W/SW/SE Attending Magalys Hamilton MD   Hosp Day # 2 PCP Rowdy Lang, DO       Subjective:   Saturnino Patterson is a(n) 64 year old male recovering from a right total hip arthroplasty through an anterior approach.  He is up in bed.  His wife is at the bedside.  He feels better than he did yesterday.  No complaints of any significant     Objective:   Blood pressure 128/76, pulse 81, temperature 98.4 °F (36.9 °C), temperature source Oral, resp. rate 18, height 5' 10\" (1.778 m), weight 155 lb (70.3 kg), SpO2 98%.    General appearance: alert, appears stated age and cooperative  INCISION/WOUND: clean, dry and intact, wound vac dressing intact and in place and no evidence of infection  Extremities: No calf pain  Pulses: 2+ and symmetric  Neurologic: Grossly normal    Assessment and Plan:     Primary osteoarthritis of right hip  Day #2 status post right total hip arthroplasty through an anterior approach.  Patient is feeling better.  Discharge home today with home care if cleared by cardiology and medicine.  Follow-up in the clinic as previously scheduled.      Essential hypertension        Pre-op testing          Results:     Lab Results   Component Value Date    WBC 6.9 2024    HGB 8.4 (L) 2024    HCT 25.1 (L) 2024    .0 2024    CREATSERUM 0.96 2024    BUN 15 2024     2024    K 4.1 2024     2024    CO2 28.0 2024     (H) 2024    CA 8.2 (L) 2024    ALB 4.3 2024    ALKPHO 82 2024    BILT 0.8 2024    TP 7.1 2024    AST 21 2024    ALT 21 2024    TSH 2.311 2024       XR HIP W OR WO PELVIS 2 OR 3 VIEWS, RIGHT (CPT=73502)    Result Date: 2024  CONCLUSION: Postsurgical changes of right hip arthroplasty.  Dictated by  (CST): Rajinder Cervantes MD on 5/28/2024 at 10:58 AM     Finalized by (CST): Rajinder Cervantes MD on 5/28/2024 at 11:00 AM          XR FLUOROSCOPY C-ARM TIME LESS THAN 1 HOUR (CPT=76000)    Result Date: 5/28/2024  CONCLUSION: Fluoroscopic guidance as above.  24.8-seconds of fluoroscopy time were used. 2 fluoroscopic images as well as a 1 page-dose summary image are stored with this exam.  RADIATION DOSE (Dose Area Product):  0.11884-zAl*m^2.   Dictated by (CST): Rajinder Cervantes MD on 5/28/2024 at 10:35 AM     Finalized by (CST): Rajinder Cervantes MD on 5/28/2024 at 10:35 AM         EKG 12 Lead    Result Date: 5/29/2024  Normal sinus rhythm Minimal voltage criteria for LVH, may be normal variant ( Sokolow-Su ) Nonspecific T wave abnormality Abnormal ECG When compared with ECG of 14-MAY-2024 16:27, No significant change was found Confirmed by ELIZABETH WOLFE ELMER ([id number]) on 5/29/2024 3:35:10 PM       NANCY SANZ PA-C  5/30/2024

## 2024-05-30 NOTE — PLAN OF CARE
Patient AO x4. POD 2 of right hip. Dressing intact. Ambulates with walker SBA. Remote tele on, no calls. V/s stable. Voiding freely, up to bathroom. Call light within reach. Fall precautions, bed alarm on. Plan for HH today once medically cleared.     Problem: Patient Centered Care  Goal: Patient preferences are identified and integrated in the patient's plan of care  Description: Interventions:  - What would you like us to know as we care for you? Patient is from home with wife who is an employee here and she's his support system.  - Provide timely, complete, and accurate information to patient/family  - Incorporate patient and family knowledge, values, beliefs, and cultural backgrounds into the planning and delivery of care  - Encourage patient/family to participate in care and decision-making at the level they choose  - Honor patient and family perspectives and choices  Outcome: Progressing     Problem: Patient/Family Goals  Goal: Patient/Family Long Term Goal  Description: Patient's Long Term Goal: Patient will be able to ambulate well and will have no complications from surgery.    Interventions:  - Up as tolerated with walker, WBAT to right leg.  - Maintain hip precautions as instructed.  - Monitor incision for any signs of infection and bleeding.  - Pain management with oral medication.  - Take oral medication for anticoagulation to prevent blood clots.  - May ice incision to prevent swelling or help reduce pain.  - Home health PT at home.  - See additional Care Plan goals for specific interventions  Outcome: Progressing  Goal: Patient/Family Short Term Goal  Description: Patient's Short Term Goal: Home with [facility] when passes PT    Interventions:   - Up as tolerated with walker, WBAT to right leg.  - Maintain hip precautions as instructed.  - Monitor incision for any signs of infection and bleeding.  - Pain management with oral medication.  - SCD's to both legs and administer oral medication for anticoagulation  to prevent blood clots.  - May ice incision to prevent swelling or help reduce pain.  - PT/OT as ordered.  - See additional Care Plan goals for specific interventions  Outcome: Progressing     Problem: PAIN - ADULT  Goal: Verbalizes/displays adequate comfort level or patient's stated pain goal  Description: INTERVENTIONS:  - Encourage pt to monitor pain and request assistance  - Assess pain using appropriate pain scale  - Administer analgesics based on type and severity of pain and evaluate response  - Implement non-pharmacological measures as appropriate and evaluate response  - Consider cultural and social influences on pain and pain management  - Manage/alleviate anxiety  - Utilize distraction and/or relaxation techniques  - Monitor for opioid side effects  - Notify MD/LIP if interventions unsuccessful or patient reports new pain  - Anticipate increased pain with activity and pre-medicate as appropriate  Outcome: Progressing     Problem: RISK FOR INFECTION - ADULT  Goal: Absence of fever/infection during anticipated neutropenic period  Description: INTERVENTIONS  - Monitor WBC  - Administer growth factors as ordered  - Implement neutropenic guidelines  Outcome: Progressing     Problem: SAFETY ADULT - FALL  Goal: Free from fall injury  Description: INTERVENTIONS:  - Assess pt frequently for physical needs  - Identify cognitive and physical deficits and behaviors that affect risk of falls.  - [place] fall precautions as indicated by assessment.  - Educate pt/family on patient safety including physical limitations  - Instruct pt to call for assistance with activity based on assessment  - Modify environment to reduce risk of injury  - Provide assistive devices as appropriate  - Consider OT/PT consult to assist with strengthening/mobility  - Encourage toileting schedule  Outcome: Progressing     Problem: DISCHARGE PLANNING  Goal: Discharge to home or other facility with appropriate resources  Description:  INTERVENTIONS:  - Identify barriers to discharge w/pt and caregiver  - Include patient/family/discharge partner in discharge planning  - Arrange for needed discharge resources and transportation as appropriate  - Identify discharge learning needs (meds, wound care, etc)  - Arrange for interpreters to assist at discharge as needed  - Consider post-discharge preferences of patient/family/discharge partner  - Complete POLST form as appropriate  - Assess patient's ability to be responsible for managing their own health  - Refer to Case Management Department for coordinating discharge planning if the patient needs post-hospital services based on physician/LIP order or complex needs related to functional status, cognitive ability or social support system  Outcome: Progressing     Problem: GENITOURINARY - ADULT  Goal: Absence of urinary retention  Description: INTERVENTIONS:  - Assess patient’s ability to void and empty bladder  - Monitor intake/output and perform bladder scan as needed  - Follow urinary retention protocol/standard of care  - Consider collaborating with pharmacy to review patient's medication profile  - Implement strategies to promote bladder emptying  Outcome: Progressing     Problem: SKIN/TISSUE INTEGRITY - ADULT  Goal: Incision(s), wounds(s) or drain site(s) healing without S/S of infection  Description: INTERVENTIONS:  - Assess and document risk factors for pressure ulcer development  - Assess and document skin integrity  - Assess and document dressing/incision, wound bed, drain sites and surrounding tissue  - Implement wound care per orders  - Initiate isolation precautions as appropriate  - Initiate Pressure Ulcer prevention bundle as indicated  Outcome: Progressing     Problem: MUSCULOSKELETAL - ADULT  Goal: Return mobility to safest level of function  Description: INTERVENTIONS:  - Assess patient stability and activity tolerance for standing, transferring and ambulating w/ or w/o assistive  devices  - Assist with transfers and ambulation using safe patient handling equipment as needed  - Ensure adequate protection for wounds/incisions during mobilization  - Obtain PT/OT consults as needed  - Advance activity as appropriate  - Communicate ordered activity level and limitations with patient/family  Outcome: Progressing  Goal: Maintain proper alignment of affected body part  Description: INTERVENTIONS:  - Support and protect limb and body alignment per provider's orders  - Instruct and reinforce with patient and family use of appropriate assistive device and precautions (e.g. spinal or hip dislocation precautions)  Outcome: Progressing

## 2024-05-30 NOTE — PAYOR COMM NOTE
--------------  CONTINUED STAY REVIEW    Payor: [employer]  Subscriber #:  [id number]  Authorization Number: [id number]    Admit date: 5/28/24  Admit time: 10:57 AM    REVIEW DOCUMENTATION:   5-28-24       Pre-Operative Diagnosis: Primary osteoarthritis of right hip [503920]     Post-Operative Diagnosis: Primary osteoarthritis of right hip [220164]      Procedure Performed:   Right total hip arthroplasty, anterior approach          REASON FOR ADMISSION:  Right total hip arthroplasty.     HISTORY OF PRESENT ILLNESS:  The patient is a 64-year-old  male with chronic right hip pain and underlying severe primary osteoarthritis, failed outpatient conservative medical management options, scheduled today for above-mentioned procedure by his orthopedic surgeon, Dr. Caesar Negro.  Preoperatively, he had spinal block.  Postoperatively transferred to PACU for further monitoring      PHYSICAL EXAMINATION:    GENERAL:  Alert, oriented to time, place, and person, no acute distress.  VITAL SIGNS:  Temperature 97.6, pulse 46, respiratory rate 11, blood pressure 115/79, pulse ox 100% on room air.  HEENT:  Atraumatic.  Oropharynx clear.  Moist mucous membranes.  Ears, nose normal.  Eyes:  Anicteric sclerae.  NECK:  Supple.  No lymphadenopathy.  Trachea midline.  Full range of motion.  LUNGS:  Clear to auscultation bilaterally.  Normal respiratory effort.   HEART:  Regular rate and rhythm.  S1 and S2 auscultated.  No murmur.  ABDOMEN:  Soft, nondistended.  No tenderness.  Positive bowel sounds.  EXTREMITIES:  No peripheral edema, clubbing, or cyanosis.  Right hip Prevena wound VAC dressing.  NEUROLOGIC:  Decreased sensation and muscle movement both lower extremities post spinal block.  No other focal finding.     ASSESSMENT AND PLAN:    1.       Right hip primary osteoarthritis status post right total hip arthroplasty, spinal block.  Pain control.  Neurovascular checks.  Aspirin for DVT prophylaxis.  Physical and  occupational therapy.  2.       Essential hypertension.  Continue home medications and monitor.         REVIEW DOCUMENTATION  5-29-24     Per tele pauses 6.19 seconds and 5.70 seconds at that time and Dr Hamilton at bedside informed of this.  IV infusing for now, 100% room air. Zofran given for nausea.     Patient here for rt. Total hip arthroplasty  This am, RRT called for syncopal episode   Walking with PT, walked about ~100 ft and then became light headed, assisted into chair.    Vital signs:  Temp:  [97.6 °F (36.4 °C)-98.9 °F (37.2 °C)] 98.6 °F (37 °C)  Pulse:  [46-88] 75  Resp:  [11-18] 18  BP: (104-181)/(67-84) 128/75  SpO2:  [95 %-100 %] 100 %      Physical Exam:    General: No acute distress. Alert ,         Respiratory: Clear to auscultation bilaterally. No wheezes. No rhonchi.  Cardiovascular: S1, S2. Regular rate and rhythm. No murmurs, rubs or gallops.   Abdomen: Soft, nontender, nondistended.  Positive bowel sounds. No rebound or guarding.  Neurologic: No focal neurological deficits.   Musculoskeletal: Moves all extremities.  Extremities: No edema.    Assessment & Plan:  ASSESSMENT / PLAN:      #Syncope  -while walking with PT  -had a syncopal episode yesterday as well  -vitals stable  -check am labs  -had 2 pauses on tele  -check echocardiogram  -cont to monitor on tele  -cardiology consultation     Rt. Hip OA  -s/p rt. Total hip arthroplasty  -pod # 1  -pain control  -asa for dvt prophylaxis     HTN  -cont cozaar     Acute Post operative blood loss anemia  -hb 14.9 prior to surgery  -hb 11 post op  -follow-up hb from today  -no indications for blood transfusion      iron sucrose (Venofer) 400 mg in sodium chloride 0.9% 250 mL IVPB  Dose: 400 mg  Freq: Once Route: IV  Last Dose: 400 mg (05/30/24 1246)  Start: 05/30/24 1230 End: 05/30/24 1516  sodium chloride 0.9% infusion  Rate: 125 mL/hr  Freq: Continuous Route: IV  Start: 05/28/24 1100  ondansetron (Zofran) 4 MG/2ML injection 4 mg  Dose: 4 mg  Freq:  Every 6 hours PRN Route: IV  PRN Reasons: Nausea,vomiting  Start: 05/28/24 1058     X2 ON 28/ X1 ON 29 05/29/24 1616 -- -- 18 99/56 100 % -- None (Room air) -- BB   05/29/24 1614 -- -- 18 108/67 98 % -- None (Room air) -- BB   05/29/24 1612 98.1 °F (36.7 °C) 75 18 121/69 100 % -- None (Room air) -- BB   05/29/24 1342 -- 76 -- 125/75 -- -- -- -- GR   05/29/24 1046 -- 82 -- 125/74 98 % -- -- -- BN   05/29/24 0817 -- 75 -- 128/75 100 % -- None (Room air) -- BV   05/29/24 0811 -- -- 18 138/74 -- -- -- -- BV   05/29/24 0805 -- -- 18 181/82 Abnormal  100 % -- None (Room air) -- BV   05/29/24 0738 98.6 °F (37 °C) -- 18 123/74 97 % -- None (Room air) -- JT   05/29/24 0443 98.8 °F (37.1 °C) 88 16 120/67 98 % -- None (Room air) -- DJ   05/28/24 2356 98 °F (36.7 °C) 80 16 115/74 96 % -- None (Room air) -- IL   05/28/24 2143 98.9 °F (37.2 °C) 76 16 125/72 95 % -- None (Room air) -- DJ

## 2024-05-30 NOTE — DISCHARGE SUMMARY
[facility]  part of [facility]    Discharge Summary    Saturnino Patterson Patient Status:  Inpatient    1960 MRN [id number]   Location [facility] 4W/SW/SE Attending Magalys Hamilton MD   Hosp Day # 2 PCP Rowdy Lang DO     Date of Admission: 2024 Disposition: Home or Self Care     Date of Discharge: 24      Admitting Diagnosis: Primary osteoarthritis of right hip [M16.11]  Primary osteoarthritis of right hip    Hospital Discharge Diagnoses:  rt. Hip OA    Lace+ Score: 25  59-90 High Risk  29-58 Medium Risk  0-28   Low Risk.    TCM Follow-Up Recommendation:  LACE < 29: Low Risk of readmission after discharge from the hospital. No TCM follow-up needed.      Problem List:   Patient Active Problem List   Diagnosis    Depressive disorder    Generalized anxiety disorder    Insomnia related to another mental disorder    Seasonal affective disorder (HCC)    Vitamin D deficiency    Primary osteoarthritis of right hip    Essential hypertension    Pre-op testing       Reason for Admission:   Syncope  Rt. Hip OA  HTN  Acute Post operative blood loss anemia    Physical Exam:   General appearance: alert, appears stated age and cooperative  Pulmonary:  clear to auscultation bilaterally  Cardiovascular: S1, S2 normal, no murmur, click, rub or gallop, regular rate and rhythm  Abdominal: soft, non-tender; bowel sounds normal; no masses,  no organomegaly  Extremities: extremities normal, atraumatic, no cyanosis or edema  Psychiatric: calm      History of Present Illness:   Per ortho  Saturnino Patterson is a(n) 64 year old male.  He presents with complaints of bilateral hip pain, worse on the right than the left.  It has been present and increasing in severity for a number of years to the point where he is becoming dysfunctional.  He works as a .  It is difficult for him to walk his route.  He has troubles with activities of daily living.  He cannot get his shoes and socks on and  off or get on and off the toilet, particularly in the morning.  He can play with his grandchildren.     Hospital Course:   Syncope-> vasovagal  -while walking with PT  -had a syncopal episode yesterday as well  -vitals stable  -check am labs  -had 2 pauses on tele. And bradycardia, due to vasovasl syncope  -charly EP Eval  -cont to monitor on tele  -cardiology consultation--> charly  -echo wnl, follow-up dilated aortic root as op d/w patient and wife     Rt. Hip OA  -s/p rt. Total hip arthroplasty  -pod # 2  -pain control  -asa for dvt prophylaxis     HTN  -cont cozaar     Acute Post operative blood loss anemia  -hb 14.9 prior to surgery  -hb 11 post op-->8.4-->8.1  -given 400mg of iv venofer  -no indications for blood transfusion    Consultations: orthopedics  cardiology    Procedures: s/p rt. Hip total arthroplasty    Complications: n/a    Discharge Condition: Good    Discharge Medications:      Discharge Medications        START taking these medications        Instructions Prescription details   aspirin 325 MG Tbec      Take 1 tablet (325 mg total) by mouth in the morning and 1 tablet (325 mg total) before bedtime.   Quantity: 60 tablet  Refills: 0     docusate sodium 100 MG Caps  Commonly known as: COLACE      Take 100 mg by mouth 2 (two) times daily.   Quantity: 20 capsule  Refills: 0     ferrous sulfate 325 (65 FE) MG Tbec      Take 1 tablet (325 mg total) by mouth daily with breakfast.   Quantity: 20 tablet  Refills: 0     HYDROcodone-acetaminophen 7.5-325 MG Tabs  Commonly known as: Norco      Take 1 tablet by mouth every 6 (six) hours as needed.   Quantity: 40 tablet  Refills: 0            CONTINUE taking these medications        Instructions Prescription details   ALPRAZolam 0.25 MG Tabs  Commonly known as: Xanax      Take 1 tablet (0.25 mg total) by mouth 3 (three) times daily as needed.   Quantity: 20 tablet  Refills: 0     PARoxetine 30 MG Tabs  Commonly known as: Paxil      Take 1 tablet (30 mg total) by  mouth daily.   Quantity: 30 tablet  Refills: 1            STOP taking these medications      Telmisartan 20 MG Tabs                  Where to Get Your Medications        These medications were sent to [facility]/pharmacy #[id number] - [place], [place] - [address] AT [facility], [phone], [phone]  [address] [facility] [address]      Hours: 24-hours Phone: [phone]   aspirin 325 MG Tbec  docusate sodium 100 MG Caps  ferrous sulfate 325 (65 FE) MG Tbec  HYDROcodone-acetaminophen 7.5-325 MG Tabs         Follow up Visits: Follow-up with pcp in 1 week    Follow up Labs: n/a     Other Discharge Instructions: follow-up with cardiology, ambulatory bp monitoring  Follow-up echo --> with dilated aortic root.     JAMISON COLLINS MD  5/30/2024  9:59 AM    > 35 min

## 2024-05-30 NOTE — CM/SW NOTE
05/30/24 1000   Discharge disposition   Expected discharge disposition Home-Health   Post Acute Care Provider Residential   Discharge transportation Private car     Pt discussed during nursing rounds. Pt is stable for MS today. MD MS order entered. [facility] will provide RN and therapy services at MS, sydnee Roberts notified of MS home today. Pt's spouse will provide transport at MS.    Plan: Home w/spouse with [facility] today.     / to remain available for support and/or discharge planning.     DEZ Quintana    [phone]

## 2024-05-30 NOTE — PROGRESS NOTES
[facility] Cardiology Progress Note    Saturnino Patterson Patient Status:  Inpatient    1960 MRN [id number]   Location [facility] 4W/SW/SE Attending Magalys Hamilton MD   Hosp Day # 2 PCP Rowdy Lang,      Subjective:  No CV concerns. No recurrent vasovagal events. No tele events overnight & today   Wife at bedside     Objective:  /78 (BP Location: Right arm)   Pulse 83   Temp 98.1 °F (36.7 °C) (Oral)   Resp 18   Ht 177.8 cm (5' 10\")   Wt 155 lb (70.3 kg)   SpO2 98%   BMI 22.24 kg/m²     Telemetry: NSR       Intake/Output:    Intake/Output Summary (Last 24 hours) at 2024 1024  Last data filed at 2024 0838  Gross per 24 hour   Intake 1720 ml   Output 2600 ml   Net -880 ml       Last 3 Weights   24 1732 155 lb (70.3 kg)   24 0624 155 lb (70.3 kg)   24 0819 155 lb (70.3 kg)   24 1442 151 lb (68.5 kg)   24 1422 155 lb (70.3 kg)       Labs:  Recent Labs   Lab 24  0743   *   BUN 15   CREATSERUM 0.96   EGFRCR 88   CA 8.2*      K 4.1      CO2 28.0     Recent Labs   Lab 24  1037 24  0743   RBC 3.71* 2.83*   HGB 11.0* 8.4*   HCT 33.7* 25.1*   MCV 90.8 88.7   MCH 29.6 29.7   MCHC 32.6 33.5   RDW 13.2 13.3   WBC 5.8 6.9   .0 180.0         No results for input(s): \"TROP\", \"TROPHS\", \"CK\" in the last 168 hours.    Diagnostics:   24 Echo  Findings:   Left ventricle:  The cavity size was normal. Wall thickness was normal.   Systolic function was vigorous. The estimated ejection fraction was 65-70%,   by visual assessment. No diagnostic evidence for regional wall motion   abnormalities. Left ventricular diastolic function parameters were normal.   Left atrium:  The left atrial volume was normal.   Right ventricle:  The cavity size was normal. Systolic function was normal.   Right atrium:  Well visualized. The atrium was normal in size.   Mitral valve:  The leaflets were mildly calcified. Leaflet separation  was   normal.  Doppler:  Transvalvular velocity was within the normal range. There   was no evidence for stenosis. There was trace regurgitation.   Aortic valve:  The valve was structurally normal. The valve was trileaflet.   Cusp separation was normal.  Doppler:  Transvalvular velocity was within the   normal range. There was no evidence for stenosis. There was trivial   regurgitation.    The peak systolic gradient was 9mm Hg.   Tricuspid valve:  The valve is structurally normal. Leaflet separation was   normal.  Doppler:  Transvalvular velocity was within the normal range. There   was no evidence for stenosis. There was trace regurgitation.   Pulmonic valve:   The valve is structurally normal. Cusp separation was   normal.  Doppler:  Transvalvular velocity was within the normal range. There   was no evidence for stenosis. There was no significant regurgitation.   Pericardium:   There was no pericardial effusion.   Aorta:   Aortic root: The aortic root was borderline dilated and 4.0cm diameter.   Ascending aorta: The ascending aorta was normal.   Pulmonary arteries:   Systolic pressure was within the normal range.   Systemic veins:  Central venous respirophasic diameter changes are in the   normal range (>50%).   Inferior vena cava: The IVC was normal-sized.       Review of Systems   Respiratory: Negative.     Cardiovascular: Negative.      Physical Exam:    General: Alert and oriented x 3. No apparent distress.   HEENT: Normocephalic, anicteric sclera, neck supple, no thyromegaly or adenopathy.  Neck: No JVD, carotids 2+, no bruits.  Cardiac: Regular rate & rhythm. S1, S2 normal. No murmur, pericardial rub, S3, or extra cardiac sounds.  Lungs: Clear without wheezes, rales, rhonchi or dullness.  Normal excursions and effort.  Abdomen: Soft, non-tender. No organosplenomegally, mass or rebound, BS-present.  Extremities: Without clubbing or cyanosis.  No left lower extremity edema, no right lower extremity  edema.  Neurologic: Alert and oriented, normal affect. No focal defects  Skin: Warm and dry.       Medications:   iron sucrose  400 mg Intravenous Once    sennosides  17.2 mg Oral Nightly    docusate sodium  100 mg Oral BID    aspirin  325 mg Oral BID    famotidine  20 mg Oral BID    Or    famotidine  20 mg Intravenous BID    ferrous sulfate  325 mg Oral Daily with breakfast    PARoxetine  30 mg Oral Daily    losartan  25 mg Oral Daily      sodium chloride 125 mL/hr at 05/29/24 1047       Assessment:    Primary osteoarthritis of right hip  -5/28 s/p right total hip arthroplasty    Vasovagal syncope   -Long personal history of this, with typical triggers/context   -Recent episodes associated with postop state and early ambulation  -Notable mild/brief cardioinhibitory component with vagally-mediated bradycardia.   -Pacemaker is not indicated  -Encouraged water intake to 2-2.5 L daily  -Lie down immediately if any prodromal symptoms       Essential hypertension  -BP well controlled. Continue losartan    Plan:    POD#2 s/p right total hip arthroplasty  Hemodynamically stable. No recurrent vasovagal episodes or pauses noted.   Hgb 8.4 today. Monitor H&H. PMD / Surgery following   Echocardiogram w/ preserved EF, no wma or significant valve abnormalities. Aortic root borderline dilated & 4.0cm diameter. Recommend outpatient surveillance  No acute CV concerns. Will sign off . Please call with any questions/concerns      DEANA Gomez  5/30/2024  10:24 AM  Ph [phone] ([place])  Ph [phone] ([place])

## 2024-05-30 NOTE — PLAN OF CARE
Saturnino is from home with is wife.pod 2 Right THR-anterior approach.cms wnl, prevena drsg remains patent c/d/I,afebrile-no s/s infection, osbaldo diet, lbm 5/28-colace given/declines any other meds to assist with bm, voiding, vs wnl, cleared physical therapy-recommend rolling walker, norco/ice pain control, asa bid/scds, remote tele-see documentation, patient had rrt yesterday after vaso vagal episode-cleared by cardiology, repeat h&h 8.1-cont po fe, and iv dose venofer also ordered-to be given prior to dc home today-home with h hc-refer to dc summary/instructions  Problem: Patient Centered Care  Goal: Patient preferences are identified and integrated in the patient's plan of care  Description: Interventions:  - What would you like us to know as we care for you? Patient is from home with wife who is an employee here and she's his support system.  - Provide timely, complete, and accurate information to patient/family  - Incorporate patient and family knowledge, values, beliefs, and cultural backgrounds into the planning and delivery of care  - Encourage patient/family to participate in care and decision-making at the level they choose  - Honor patient and family perspectives and choices  Outcome: Adequate for Discharge     Problem: Patient/Family Goals  Goal: Patient/Family Long Term Goal  Description: Patient's Long Term Goal: Patient will be able to ambulate well and will have no complications from surgery.    Interventions:  - Up as tolerated with walker, WBAT to right leg.  - Maintain hip precautions as instructed.  - Monitor incision for any signs of infection and bleeding.  - Pain management with oral medication.  - Take oral medication for anticoagulation to prevent blood clots.  - May ice incision to prevent swelling or help reduce pain.  - Home health PT at home.  - See additional Care Plan goals for specific interventions  Outcome: Adequate for Discharge  Goal: Patient/Family Short Term Goal  Description:  Patient's Short Term Goal: Home with [facility] when passes PT    Interventions:   - Up as tolerated with walker, WBAT to right leg.  - Maintain hip precautions as instructed.  - Monitor incision for any signs of infection and bleeding.  - Pain management with oral medication.  - SCD's to both legs and administer oral medication for anticoagulation to prevent blood clots.  - May ice incision to prevent swelling or help reduce pain.  - PT/OT as ordered.  - See additional Care Plan goals for specific interventions  Outcome: Adequate for Discharge     Problem: PAIN - ADULT  Goal: Verbalizes/displays adequate comfort level or patient's stated pain goal  Description: INTERVENTIONS:  - Encourage pt to monitor pain and request assistance  - Assess pain using appropriate pain scale  - Administer analgesics based on type and severity of pain and evaluate response  - Implement non-pharmacological measures as appropriate and evaluate response  - Consider cultural and social influences on pain and pain management  - Manage/alleviate anxiety  - Utilize distraction and/or relaxation techniques  - Monitor for opioid side effects  - Notify MD/LIP if interventions unsuccessful or patient reports new pain  - Anticipate increased pain with activity and pre-medicate as appropriate  Outcome: Adequate for Discharge     Problem: RISK FOR INFECTION - ADULT  Goal: Absence of fever/infection during anticipated neutropenic period  Description: INTERVENTIONS  - Monitor WBC  - Administer growth factors as ordered  - Implement neutropenic guidelines  Outcome: Adequate for Discharge     Problem: SAFETY ADULT - FALL  Goal: Free from fall injury  Description: INTERVENTIONS:  - Assess pt frequently for physical needs  - Identify cognitive and physical deficits and behaviors that affect risk of falls.  - [place] fall precautions as indicated by assessment.  - Educate pt/family on patient safety including physical limitations  - Instruct pt to call for  assistance with activity based on assessment  - Modify environment to reduce risk of injury  - Provide assistive devices as appropriate  - Consider OT/PT consult to assist with strengthening/mobility  - Encourage toileting schedule  Outcome: Adequate for Discharge     Problem: DISCHARGE PLANNING  Goal: Discharge to home or other facility with appropriate resources  Description: INTERVENTIONS:  - Identify barriers to discharge w/pt and caregiver  - Include patient/family/discharge partner in discharge planning  - Arrange for needed discharge resources and transportation as appropriate  - Identify discharge learning needs (meds, wound care, etc)  - Arrange for interpreters to assist at discharge as needed  - Consider post-discharge preferences of patient/family/discharge partner  - Complete POLST form as appropriate  - Assess patient's ability to be responsible for managing their own health  - Refer to Case Management Department for coordinating discharge planning if the patient needs post-hospital services based on physician/LIP order or complex needs related to functional status, cognitive ability or social support system  Outcome: Adequate for Discharge     Problem: GENITOURINARY - ADULT  Goal: Absence of urinary retention  Description: INTERVENTIONS:  - Assess patient’s ability to void and empty bladder  - Monitor intake/output and perform bladder scan as needed  - Follow urinary retention protocol/standard of care  - Consider collaborating with pharmacy to review patient's medication profile  - Implement strategies to promote bladder emptying  Outcome: Adequate for Discharge     Problem: SKIN/TISSUE INTEGRITY - ADULT  Goal: Incision(s), wounds(s) or drain site(s) healing without S/S of infection  Description: INTERVENTIONS:  - Assess and document risk factors for pressure ulcer development  - Assess and document skin integrity  - Assess and document dressing/incision, wound bed, drain sites and surrounding  tissue  - Implement wound care per orders  - Initiate isolation precautions as appropriate  - Initiate Pressure Ulcer prevention bundle as indicated  Outcome: Adequate for Discharge     Problem: MUSCULOSKELETAL - ADULT  Goal: Return mobility to safest level of function  Description: INTERVENTIONS:  - Assess patient stability and activity tolerance for standing, transferring and ambulating w/ or w/o assistive devices  - Assist with transfers and ambulation using safe patient handling equipment as needed  - Ensure adequate protection for wounds/incisions during mobilization  - Obtain PT/OT consults as needed  - Advance activity as appropriate  - Communicate ordered activity level and limitations with patient/family  Outcome: Adequate for Discharge  Goal: Maintain proper alignment of affected body part  Description: INTERVENTIONS:  - Support and protect limb and body alignment per provider's orders  - Instruct and reinforce with patient and family use of appropriate assistive device and precautions (e.g. spinal or hip dislocation precautions)  Outcome: Adequate for Discharge

## 2024-05-30 NOTE — PHYSICAL THERAPY NOTE
PHYSICAL THERAPY HIP TREATMENT NOTE - INPATIENT    Room Number: 426/426-A            Presenting Problem: s/p R FAVIO, anterior approach on        Problem List  Principal Problem:    Primary osteoarthritis of right hip  Active Problems:    Essential hypertension    Pre-op testing      PHYSICAL THERAPY ASSESSMENT   Patient demonstrates good  progress this session, goals  remain in progress.    Patient continues to function below baseline with bed mobility, transfers, and gait.  Contributing factors to remaining limitations include decreased functional strength, decreased endurance/aerobic capacity, and pain.  Next session anticipate patient to progress bed mobility, transfers, and gait.  Physical Therapy will continue to follow patient for duration of hospitalization.    Patient continues to benefit from continued skilled PT services: at discharge to promote functional independence in home.  Anticipate patient will return home with home health PT.    PLAN  PT Treatment Plan: Bed mobility;Body mechanics;Coordination;Patient education  Frequency (Obs): Daily    SUBJECTIVE  Pt reports being ready for PT RX    OBJECTIVE  Precautions: FAVIO - anterior    WEIGHT BEARING RESTRICTION        R Lower Extremity: Weight Bearing as Tolerated       PAIN ASSESSMENT   Ratin  Location: right hip  Management Techniques: Activity promotion;Body mechanics;Repositioning    BALANCE  Static Sitting: Good  Dynamic Sitting: Fair +  Static Standing: Fair  Dynamic Standing: Fair -  ACTIVITY TOLERANCE                         O2 WALK       AM-PAC '6-Clicks' INPATIENT SHORT FORM - BASIC MOBILITY  How much difficulty does the patient currently have...  Patient Difficulty: Turning over in bed (including adjusting bedclothes, sheets and blankets)?: A Little   Patient Difficulty: Sitting down on and standing up from a chair with arms (e.g., wheelchair, bedside commode, etc.): A Little   Patient Difficulty: Moving from lying on back to sitting on  the side of the bed?: A Little   How much help from another person does the patient currently need...   Help from Another: Moving to and from a bed to a chair (including a wheelchair)?: A Little   Help from Another: Need to walk in hospital room?: A Little   Help from Another: Climbing 3-5 steps with a railing?: A Little     AM-PAC Score:  Raw Score: 18   Approx Degree of Impairment: 46.58%   Standardized Score (AM-PAC Scale): 43.63   CMS Modifier (G-Code): CK    FUNCTIONAL ABILITY STATUS  Functional Mobility/Gait Assessment  Gait Assistance: Contact guard assist  Distance (ft): 2 x  100  Assistive Device: Rolling walker  Pattern: R Decreased stance time  Stairs: Stairs  How Many Stairs: 4  Device: 2 Rails  Assist: Contact guard assist  Pattern: Ascend and Descend  Rolling: minimal assist  Supine to Sit: minimal assist  Sit to Supine: minimal assist  Sit to Stand: minimal assist    Additional Information: AM session.Min a for bed mobility and transfer.Extra time provided to complete task.There ex.EOB sitting balance activity with emphasis on core stabilization.Pt amb 2 x 100  ft with RW and CGA; provided cuing for gait pattern as well as for postural awareness;No c/o dizziness.Provided cuing for gait pattern as well as for postural awareness.Family present;family education;all questions and concerns addressed.Navigated 4 stairs with CGA.    The patient's Approx Degree of Impairment: 46.58% has been calculated based on documentation in the [facility] '6 clicks' Inpatient Basic Mobility Short Form.  Research supports that patients with this level of impairment may benefit from Home with Home Health PT.  Final disposition will be made by interdisciplinary medical team.    Exercises AM Session PM Session   Ankle Pumps     20 reps 0 reps   Quad Sets 20 reps 0  reps   Glut Sets 20 reps 0  reps                                               Patient End of Session: Up in chair;Call light within reach;RN aware of  session/findings;All patient questions and concerns addressed    CURRENT GOALS   Patient Goal Patient's self-stated goal is: go home   Goal #1 Patient is able to demonstrate supine - sit EOB @ level: modified independent   Goal #1   Current Status Min a   Goal #2 Patient is able to demonstrate transfers Sit to/from Stand at assistance level: supervision     Goal #2  Current Status Min a   Goal #3 Patient is able to ambulate 150 feet with assistive device at assistance level: supervision   Goal #3   Current Status Pt amb 2 x 100 ft with RW and CGA   Goal #4 Patient will negotiate 5 stairs/one curb w/ assistive device and supervision   Goal #4   Current Status Navigated 4 stairs with CGA   Goal #5 Patient verbalizes and/or demonstrates all precautions and safety concerns independently   Goal #5   Current Status In progress   Goal #6 Patient independently performs home exercise program for ROM/strengthening per the instructions provided in preparation for discharge.   Goal #6  Current Status In progress     Gait Training: 15 minutes  Therapeutic Activity: 15 minutes  Neuromuscular Re-education:  minutes  Therapeutic Exercise: 15 minutes  [place] Repositioning:  minutes  Manual Therapy:  minutes  Can add/delete any of these

## 2024-05-30 NOTE — PROGRESS NOTES
[facility]                                                            CONSULT NOTE      Patient Name: Saturnino Patterson MRN: [id number]   : 1960 CSN: [id number]       Reason for consultation:   Asked by Magalys Hamilton MD to provide evaluation and management of bradycardia.       Assessment and Recommendations:     Primary osteoarthritis of right hip  -S/p surgery    VASOVAGAL SYNCOPE  -Long personal history of this, with typical triggers/context   -Recent episodes associated with postop state and early ambulation  -Notable mild/brief cardioinhibitory component with vagally-mediated bradycardia.   -Pacemaker is not indicated  -Extensive education provided to pt and family re this condition and treatment.   -Encouraged water intake to 2-2.5 L daily  -Lie down immediately if any prodromal symptoms        Essential hypertension  -Continue losartan      Pre-op testing  -N/A          Thank you for the consultation.    Shukri Gabriel MD  Cardiac EP  [facility]  2024        History of present illness:   The patient is a 64 year old      ROS:   Constitutional: No fevers, chills, fatigue or night sweats.  ENT: No mouth pain, neck pain, running nose, headaches or swollen glands.  Skin: No rashes, pruritus or skin changes,  Respiratory: No cough, wheezing or shortness of breath.  CV: No chest pain or claudication.  Musculoskeletal: No joint pain, stiffness or swelling.  : No dysuria or hematuria.  GI: No nausea, vomiting or diarrhea. No blood in stools.  Neurologic: No seizures, tremors, weakness or numbness.      Past Medical, Surgical, Family, and Social Histories:   Past Medical History:   • Anxiety state   • Depression   • High blood pressure   • Osteoarthritis     History reviewed. No pertinent surgical history.  Family History   Problem Relation Age of Onset   • Heart Disorder Father         SHX:  The patient reports that he quit smoking about 44 years ago. His smoking use included cigarettes. He started smoking about 49 years ago. He has a 2.5 pack-year smoking history. He has never used smokeless tobacco. He reports that he does not drink alcohol and does not use drugs.    Allergies:   No Known Allergies    Medications:   • sennosides  17.2 mg Oral Nightly   • docusate sodium  100 mg Oral BID   • aspirin  325 mg Oral BID   • famotidine  20 mg Oral BID    Or   • famotidine  20 mg Intravenous BID   • ferrous sulfate  325 mg Oral Daily with breakfast   • PARoxetine  30 mg Oral Daily   • losartan  25 mg Oral Daily     • lactated ringers Stopped (05/28/24 1800)   • sodium chloride 125 mL/hr at 05/29/24 1047       PHYSICAL EXAM:   Blood pressure 124/68, pulse 80, temperature 98 °F (36.7 °C), temperature source Oral, resp. rate 18, height 177.8 cm (5' 10\"), weight 155 lb (70.3 kg), SpO2 96%.  GEN - no distress  HEENT - normal conjunctiva, moist mucosa  Neck - Supple, no LAD  Lungs - nonlabored, clear bilaterally  Heart - JVP 14 cm H2O, carotids normal; RRR, nl S1/S2, no murmur, gallop, or rub; no edema  Abd - soft, nontender  Ext - arthritic changes  Neuro - A+Ox3, no facial droop or gross deficits  Psych - cooperative, calm    LABS AND DATA:   Lab Results   Component Value Date    WBC 6.9 05/29/2024    HGB 8.4 (L) 05/29/2024    HCT 25.1 (L) 05/29/2024    .0 05/29/2024    CREATSERUM 0.96 05/29/2024    BUN 15 05/29/2024     05/29/2024    K 4.1 05/29/2024     05/29/2024    CO2 28.0 05/29/2024     (H) 05/29/2024    CA 8.2 (L) 05/29/2024    ALB 4.3 05/14/2024    ALKPHO 82 05/14/2024    BILT 0.8 05/14/2024    TP 7.1 05/14/2024    AST 21 05/14/2024    ALT 21 05/14/2024    TSH 2.311 05/29/2024       Imaging: I independently visualized all relevant chest imaging in PACS, agree with radiology interpretation except where noted.    XR HIP W OR WO PELVIS 2 OR 3 VIEWS, RIGHT  (RCM=51607)    Result Date: 5/28/2024  CONCLUSION: Postsurgical changes of right hip arthroplasty.  Dictated by (CST): Rajinder Cervantes MD on 5/28/2024 at 10:58 AM     Finalized by (CST): Rajinder Cervantes MD on 5/28/2024 at 11:00 AM          XR FLUOROSCOPY C-ARM TIME LESS THAN 1 HOUR (CPT=76000)    Result Date: 5/28/2024  CONCLUSION: Fluoroscopic guidance as above.  24.8-seconds of fluoroscopy time were used. 2 fluoroscopic images as well as a 1 page-dose summary image are stored with this exam.  RADIATION DOSE (Dose Area Product):  0.79511-oUz*m^2.   Dictated by ([facility]): Rajinder Cervantes MD on 5/28/2024 at 10:35 AM     Finalized by (CST): Rajinder Cervantes MD on 5/28/2024 at 10:35 AM               ------------------------------------------------------------------------------------------------------------------------------

## 2024-05-30 NOTE — CONSULTS
[facility]                                                            CONSULT NOTE      Patient Name: Saturnino Patterson MRN: [id number]   : 1960 CSN: [id number]       Reason for consultation:   Asked by Magalys Hamilton MD to provide evaluation and management of bradycardia.     Assessment and Recommendations:     Vasovagal syncope  -Long personal history of this, with typical triggers/context   -Recent episodes associated with postop state and early ambulation  -Notable mild/brief cardioinhibitory component with vagally-mediated bradycardia.   -Pacemaker is not indicated  -Extensive education provided to pt and family re this condition and treatment.   -Encouraged water intake to 2-2.5 L daily  -Lie down immediately if any prodromal symptoms      Essential hypertension  -Continue losartan    Primary osteoarthritis of right hip  -S/p surgery      Thank you for the consultation.    Shukri Gabriel MD  Cardiac EP  [facility] [place]  2024  C5  45 minutes in direct counseling.    History of present illness:   The patient is a 64 year old admitted for right hip surgery.  He had episode of syncope while getting up out of bed for the first time, notable for blood soaked sheets at surgical site, and then abrupt loss of consciousness, wife witnessed no seizure like activity, and about 15 minutes before he returned to normal cognition.  He was exhausted after the events.  He had another event while walking with PT, with lightheadedness, and he could be assisted to a seat.  He has had no recurrence since then.  Prior history notable for syncope in settings typical for vasovagal causes, ie giving blood.    ROS:   Constitutional: No fevers, chills, fatigue or night sweats.  ENT: No mouth pain, neck pain, running nose, headaches or swollen glands.  Skin: No rashes, pruritus or skin changes,  Respiratory: No cough,  wheezing or shortness of breath.  CV: No chest pain or claudication.  Musculoskeletal: s/p hip surgery with pain  : No dysuria or hematuria.  GI: No nausea, vomiting or diarrhea. No blood in stools.  Neurologic: No seizures, tremors, weakness or numbness.    Past Medical, Surgical, Family, and Social Histories:     Past Medical History:    Anxiety state    Depression    High blood pressure    Osteoarthritis     History reviewed. No pertinent surgical history.  Family History   Problem Relation Age of Onset    Heart Disorder Father        SHX:  The patient reports that he quit smoking about 44 years ago. His smoking use included cigarettes. He started smoking about 49 years ago. He has a 2.5 pack-year smoking history. He has never used smokeless tobacco. He reports that he does not drink alcohol and does not use drugs.    Allergies:   No Known Allergies    Medications:      sennosides  17.2 mg Oral Nightly    docusate sodium  100 mg Oral BID    aspirin  325 mg Oral BID    famotidine  20 mg Oral BID    Or    famotidine  20 mg Intravenous BID    ferrous sulfate  325 mg Oral Daily with breakfast    PARoxetine  30 mg Oral Daily    losartan  25 mg Oral Daily      lactated ringers Stopped (05/28/24 1800)    sodium chloride 125 mL/hr at 05/29/24 1047       PHYSICAL EXAM:   Blood pressure 124/68, pulse 80, temperature 98 °F (36.7 °C), temperature source Oral, resp. rate 18, height 177.8 cm (5' 10\"), weight 155 lb (70.3 kg), SpO2 96%.  GEN - no distress  HEENT - normal conjunctiva, moist mucosa  Neck - Supple, no LAD  Lungs - nonlabored, clear bilaterally  Heart - JVP 14 cm H2O, carotids normal; RRR, nl S1/S2, no murmur, gallop, or rub; no edema  Abd - soft, nontender  Ext - arthritic changes  Neuro - A+Ox3, no facial droop or gross deficits  Psych - cooperative, calm    LABS AND DATA:     TELE: sinus rhythm,  Episode of sinus slowing and sinus pause, then followed by return of sinus but with AV block, followed by  junctional beats and then return to sinus rhythm with normal AV conduction    Lab Results   Component Value Date    WBC 6.9 05/29/2024    HGB 8.4 (L) 05/29/2024    HCT 25.1 (L) 05/29/2024    .0 05/29/2024    CREATSERUM 0.96 05/29/2024    BUN 15 05/29/2024     05/29/2024    K 4.1 05/29/2024     05/29/2024    CO2 28.0 05/29/2024     (H) 05/29/2024    CA 8.2 (L) 05/29/2024    ALB 4.3 05/14/2024    ALKPHO 82 05/14/2024    BILT 0.8 05/14/2024    TP 7.1 05/14/2024    AST 21 05/14/2024    ALT 21 05/14/2024    TSH 2.311 05/29/2024       Imaging: I independently visualized all relevant chest imaging in PACS, agree with radiology interpretation except where noted.    XR HIP W OR WO PELVIS 2 OR 3 VIEWS, RIGHT (CPT=73502)    Result Date: 5/28/2024  CONCLUSION: Postsurgical changes of right hip arthroplasty.  Dictated by (CST): Rajinder Cervantes MD on 5/28/2024 at 10:58 AM     Finalized by (CST): Rajinder Cervantes MD on 5/28/2024 at 11:00 AM          XR FLUOROSCOPY C-ARM TIME LESS THAN 1 HOUR (CPT=76000)    Result Date: 5/28/2024  CONCLUSION: Fluoroscopic guidance as above.  24.8-seconds of fluoroscopy time were used. 2 fluoroscopic images as well as a 1 page-dose summary image are stored with this exam.  RADIATION DOSE (Dose Area Product):  [id number]-qGy*m^2.   Dictated by (CST): Rajinder Cervantes MD on 5/28/2024 at 10:35 AM     Finalized by (CST): Rajinder Cervantes MD on 5/28/2024 at 10:35 AM               ------------------------------------------------------------------------------------------------------------------------------

## 2024-05-31 ENCOUNTER — PATIENT OUTREACH (OUTPATIENT)
Dept: CASE MANAGEMENT | Age: 64
End: 2024-05-31

## 2024-05-31 ENCOUNTER — TELEPHONE (OUTPATIENT)
Dept: ORTHOPEDICS CLINIC | Facility: CLINIC | Age: 64
End: 2024-05-31

## 2024-05-31 NOTE — TELEPHONE ENCOUNTER
Post-op call for Dr. Negro    Date: 5/31/2024      Time: 4:42 PM   Patient: Saturnino BLAKE number: [id number]   Surgery and surgery date:5/28/2024     Procedure Laterality Anesthesia   Right total hip arthroplasty, anterior approach          Patient unavailable.  Left message on voicemail to call clinic with questions or concerns.  Number was provided./SPOKE TO PATIENT  Patient's general feeling since discharge:/OK  Pain control (0-10):/o  Pain Medication:  dose/strength  Medication Quantity Refills Start End   aspirin 325 MG Oral Tab EC         Medication Quantity Refills Start End   HYDROcodone-acetaminophen 7.5-325 MG Oral Tab 40 tablet        Fever: no  Chills: no  SOB: no  Incision site appearance:  Redness  no  Drainage No(he noticed a very small brown spot on the dressing)  Clean/dry/Intact yes   Calf pain, redness or warmth:  no   Bowel Regimen: Yes  last BM 5/30/2024  If not, what are you taking stool softener/laxative?We discussed foods to eat and to use his stool softener and miralax if needed. He had an iron infusion at the surgery center  Confirmed appointment date for post op:6/24/2024  Other concerns patients may ask: Advised to use ice. We went over the protocol 20 min on ,20min off several times then repeat 4x a day    HH was there today  Bathing and bandages   Patient needs to keep incision clean and dry for the first week./DONE  Enforce rest, ice, compression elevation and use their pain medication accordingly./DONE  If the patient needs more pain medication, please put in a communication.

## 2024-06-03 ENCOUNTER — TELEPHONE (OUTPATIENT)
Dept: ORTHOPEDICS CLINIC | Facility: CLINIC | Age: 64
End: 2024-06-03

## 2024-06-03 NOTE — TELEPHONE ENCOUNTER
michael from [facility] home health called. Completed the pt's ot evaluation.  Pt is currently independent in pt's self care.  Pt does not require any further ot.

## 2024-06-12 ENCOUNTER — TELEPHONE (OUTPATIENT)
Dept: ORTHOPEDICS CLINIC | Facility: CLINIC | Age: 64
End: 2024-06-12

## 2024-06-12 DIAGNOSIS — Z96.641 S/P TOTAL RIGHT HIP ARTHROPLASTY: Primary | ICD-10-CM

## 2024-06-12 NOTE — TELEPHONE ENCOUNTER
Called patient, no answer and voicemail box not set up so could not LM.  S/p right FAVIO on 5/28. Pending outpt physical therapy order for review.

## 2024-06-13 RX ORDER — PAROXETINE 30 MG/1
30 TABLET, FILM COATED ORAL DAILY
Qty: 90 TABLET | Refills: 3 | Status: SHIPPED | OUTPATIENT
Start: 2024-06-13

## 2024-06-14 NOTE — TELEPHONE ENCOUNTER
Refill passed per [facility] protocol.   Requested Prescriptions   Pending Prescriptions Disp Refills    PAROXETINE 30 MG Oral Tab [Pharmacy Med Name: PAROXETINE HCL 30 MG TABLET] 30 tablet 1     Sig: TAKE 1 TABLET BY MOUTH EVERY DAY       Psychiatric Non-Scheduled (Anti-Anxiety) Passed - 6/11/2024 12:15 AM        Passed - In person appointment or virtual visit in the past 6 mos or appointment in next 3 mos     Recent Outpatient Visits              3 weeks ago Essential hypertension    [facility] [place],     Office Visit    3 weeks ago [facility] osteoarthritis of right hip    [facility]    Office Visit    1 month ago Pre-op examination    [facility] [place],     Office Visit    1 month ago Primary osteoarthritis of both hips    [facility] Caesar Negro MD    Office Visit    2 months ago [facility]          Future Appointments         Provider Department Appt Notes    In 1 week Thang Perkins PA-C [facility] 1st POV Right FAVIO 5/28/ Left hip Sx 7/9/24    In 1 month Thang Perkins PA-C [facility] 1st POV; LTHA Sx 7/9/24                    Passed - Depression Screening completed within the past 12 months            Recent Outpatient Visits              [facility]     [facility]    3 weeks ago Primary osteoarthritis of right hip    [facility]    Office Visit    1 month ago  Pre-op examination    [facility] [facility], [address], [place],     Office Visit    1 month ago Primary osteoarthritis of both hips    [facility], Caesar Araujo MD    Office Visit    2 months ago Generalized anxiety disorder    [facility], [place],     Office Visit           Future Appointments         Provider Department Appt Notes    In 1 week Thang Perkins PA-C [facility], [place] 1st POV Right FAVIO 5/28/ Left hip Sx 7/9/24    [facility]; LTHA Sx 7/9/24

## 2024-06-17 RX ORDER — FERROUS SULFATE 325(65) MG
1 TABLET, DELAYED RELEASE (ENTERIC COATED) ORAL
Qty: 20 TABLET | Refills: 0 | OUTPATIENT
Start: 2024-06-17

## 2024-06-18 RX ORDER — TELMISARTAN 20 MG/1
20 TABLET ORAL DAILY
Qty: 30 TABLET | Refills: 5 | Status: SHIPPED | OUTPATIENT
Start: 2024-06-18

## 2024-06-18 NOTE — TELEPHONE ENCOUNTER
Refill passed per [facility] protocol.  However rx was discontinued from active med list stating \"stop at discharge\". Please advise on rx request?    Requested Prescriptions   Pending Prescriptions Disp Refills    TELMISARTAN 20 MG Oral Tab [Pharmacy Med Name: TELMISARTAN 20 MG TABLET] 30 tablet 0     Sig: TAKE 1 TABLET BY MOUTH EVERY DAY       Hypertension Medications Protocol Passed - 6/14/2024  7:32 PM        Passed - CMP or BMP in past 12 months        Passed - Last BP reading less than 140/90     BP Readings from Last 1 Encounters:   05/30/24 121/60               Passed - In person appointment or virtual visit in the past 12 mos or appointment in next 3 mos     Recent Outpatient Visits              3 weeks ago Essential hypertension    [facility] [place],     Office Visit    3 weeks ago [facility]    Office Visit    1 month ago Pre-op examination    [facility] [place]    Office Visit    1 month ago [facility] hips    [facility]    Office Visit    3 months ago Generalized anxiety disorder    [facility] [place]    Office Visit          Future Appointments         Provider Department Appt Notes    In 1 week Thang Perkins PA-C [facility] 1st POV Right FAVIO 5/28/ Left hip Sx 7/9/24    In [facility] L[facility]A Sx 7/9/24                    Passed - EGFRCR or GFRNAA > 50     GFR Evaluation  EGFRCR: 88 , resulted on 5/29/2024

## 2024-06-24 ENCOUNTER — OFFICE VISIT (OUTPATIENT)
Dept: ORTHOPEDICS CLINIC | Facility: CLINIC | Age: 64
End: 2024-06-24

## 2024-06-24 ENCOUNTER — HOSPITAL ENCOUNTER (OUTPATIENT)
Dept: GENERAL RADIOLOGY | Facility: HOSPITAL | Age: 64
Discharge: HOME | End: 2024-06-24
Attending: PHYSICIAN ASSISTANT

## 2024-06-24 DIAGNOSIS — Z47.89 ORTHOPEDIC AFTERCARE: Primary | ICD-10-CM

## 2024-06-24 DIAGNOSIS — Z47.89 ORTHOPEDIC AFTERCARE: ICD-10-CM

## 2024-06-24 PROCEDURE — 73502 X-RAY EXAM HIP UNI 2-3 VIEWS: CPT | Performed by: PHYSICIAN ASSISTANT

## 2024-06-24 NOTE — PROGRESS NOTES
NURSING INTAKE COMMENTS:   Chief Complaint   Patient presents with    Post-Op     1 st POV Right total hip arthroplasty, sx 24. No pain        HPI: This 64 year old male presents today for follow-up on his right hip.  He is about 4 weeks status post right total hip arthroplasty through an anterior approach.  Overall he is doing well.  He he has some soreness if he sits for prolonged periods of time.  He is ambulating without any assistive devices.  He is not taking any pain medication.  He is progressing well with therapy.    Past Medical History:    Anxiety state    Depression    High blood pressure    Osteoarthritis     History reviewed. No pertinent surgical history.  Current Outpatient Medications   Medication Sig Dispense Refill    Telmisartan 20 MG Oral Tab Take 1 tablet (20 mg total) by mouth daily. 30 tablet 5    PARoxetine 30 MG Oral Tab TAKE 1 TABLET BY MOUTH EVERY DAY 90 tablet 3    aspirin 325 MG Oral Tab EC Take 1 tablet (325 mg total) by mouth in the morning and 1 tablet (325 mg total) before bedtime. 60 tablet 0    ALPRAZolam 0.25 MG Oral Tab Take 1 tablet (0.25 mg total) by mouth 3 (three) times daily as needed. 20 tablet 0    ferrous sulfate 325 (65 FE) MG Oral Tab EC Take 1 tablet (325 mg total) by mouth daily with breakfast. (Patient not taking: Reported on 2024) 20 tablet 0    HYDROcodone-acetaminophen 7.5-325 MG Oral Tab Take 1 tablet by mouth every 6 (six) hours as needed. (Patient not taking: Reported on 2024) 40 tablet 0     No Known Allergies  Family History   Problem Relation Age of Onset    Heart Disorder Father        Social History     Occupational History    Not on file   Tobacco Use    Smoking status: Former     Current packs/day: 0.00     Average packs/day: 0.5 packs/day for 5.0 years (2.5 ttl pk-yrs)     Types: Cigarettes     Start date:      Quit date: 1980     Years since quittin.5    Smokeless tobacco: Never   Vaping Use    Vaping status: Never Used    Substance and Sexual Activity    Alcohol use: Never    Drug use: Never    Sexual activity: Not on file        Review of Systems:  GENERAL: feels generally well, no significant weight loss or weight gain  SKIN: no ulcerated or worrisome skin lesions  EYES:denies blurred vision or double vision  HEENT: denies new nasal congestion, sinus pain or ST  LUNGS: denies shortness of breath  CARDIOVASCULAR: denies chest pain  GI: no hematemesis, no worsening heartburn, no diarrhea  : no dysuria, no blood in urine, no difficulty urinating, no incontinence  MUSCULOSKELETAL: no other musculoskeletal complaints other than in HPI  NEURO: no numbness or tingling, no weakness or balance disorder  PSYCHE: no depression or anxiety  HEMATOLOGIC: no hx of blood dyscrasia  ENDOCRINE: no thyroid or diabetes issues  ALL/ASTHMA: no new hx of severe allergy or asthma    Physical Examination:    There were no vitals taken for this visit.  Constitutional: appears well hydrated, alert and responsive, no acute distress noted  Extremities: The incision is healing well.  No redness or significant swelling.  He was able to get up and ambulate without any difficulty.  Good range of motion of the hip.      Imaging: CARD ECHO 2D DOPPLER (CPT=93306)    Result Date: 2024  Transthoracic Echocardiogram Name:Saturnino Patterson Date: 2024 :  1960 Ht:  (70in)  BP: 125 / 75 MRN:  [id number]    Age:  64years    Wt:  (155lb) HR: 72bpm Loc:  [facility]       Gndr: M          BSA: 1.87m^2 Sonographer: Juan Ordering:    Magalys Hamilton Consulting:  Rick Colby ---------------------------------------------------------------------------- History/Indications:   Risk factors:  Pause. Essential Hypertension. Primary osteoarthritis of right hip. ---------------------------------------------------------------------------- Procedure information:  A transthoracic complete 2D study was performed. Additional evaluation included M-mode, complete spectral  Doppler, and color Doppler.  Patient status:  Inpatient.  Location:  Bedside.    No prior study was available for comparison.    This was a routine study. Transthoracic echocardiography for diagnosis and ventricular function evaluation. Image quality was adequate. The study was technically limited due to body habitus. ECG rhythm:   Normal sinus ---------------------------------------------------------------------------- Conclusions: 1. Left ventricle: The cavity size was normal. Wall thickness was normal.    Systolic function was vigorous. The estimated ejection fraction was    65-70%, by visual assessment. No diagnostic evidence for regional wall    motion abnormalities. Left ventricular diastolic function parameters were    normal. 2. Aortic root: The aortic root was borderline dilated and 4.0cm diameter. 3. Pulmonary arteries: Systolic pressure was within the normal range. * ---------------------------------------------------------------------------- * Findings: Left ventricle:  The cavity size was normal. Wall thickness was normal. Systolic function was vigorous. The estimated ejection fraction was 65-70%, by visual assessment. No diagnostic evidence for regional wall motion abnormalities. Left ventricular diastolic function parameters were normal. Left atrium:  The left atrial volume was normal. Right ventricle:  The cavity size was normal. Systolic function was normal. Right atrium:  Well visualized. The atrium was normal in size. Mitral valve:  The leaflets were mildly calcified. Leaflet separation was normal.  Doppler:  Transvalvular velocity was within the normal range. There was no evidence for stenosis. There was trace regurgitation. Aortic valve:  The valve was structurally normal. The valve was trileaflet. Cusp separation was normal.  Doppler:  Transvalvular velocity was within the normal range. There was no evidence for stenosis. There was trivial regurgitation.    The peak systolic gradient was 9mm  Hg. Tricuspid valve:  The valve is structurally normal. Leaflet separation was normal.  Doppler:  Transvalvular velocity was within the normal range. There was no evidence for stenosis. There was trace regurgitation. Pulmonic valve:   The valve is structurally normal. Cusp separation was normal.  Doppler:  Transvalvular velocity was within the normal range. There was no evidence for stenosis. There was no significant regurgitation. Pericardium:   There was no pericardial effusion. Aorta: Aortic root: The aortic root was borderline dilated and 4.0cm diameter. Ascending aorta: The ascending aorta was normal. Pulmonary arteries: Systolic pressure was within the normal range. Systemic veins:  Central venous respirophasic diameter changes are in the normal range (>50%). Inferior vena cava: The IVC was normal-sized. ---------------------------------------------------------------------------- Measurements  Left ventricle                    Value        Ref  IVS thickness, ED, PLAX           1.0   cm     0.6 -                                                 1.0  LV ID, ED, PLAX                   4.4   cm     4.2 -                                                 5.8  LV ID, ES, PLAX                   2.5   cm     2.5 -                                                 4.0  LV PW thickness, ED, PLAX         0.9   cm     0.6 -                                                 1.0  IVS/LV PW ratio, ED, PLAX         1.11         --------  LV PW/LV ID ratio, ED, PLAX       0.2          --------  LV ejection fraction          (H) 75    %      52 - 72  Stroke volume/bsa, 2D             41    ml/m^2 --------  LV e', lateral                    14.5  cm/sec >=10.0  LV E/e', lateral                  5            <=13  LV e', medial                     8.6   cm/sec >=7.0  LV E/e', medial                   8            --------  LV e', average                    11.5  cm/sec --------  LV E/e', average                  6            <=14  LVOT                               Value        Ref  LVOT ID                           2.2   cm     --------  LVOT peak velocity, S             1.09  m/sec  --------  LVOT VTI, S                       19.9  cm     --------  LVOT peak gradient, S             5     mm Hg  --------  LVOT mean gradient, S             3     mm Hg  --------  Stroke volume (SV), LVOT DP       76    ml     --------  Stroke index (SV/bsa), LVOT       40    ml/m^2 --------  DP  Aortic valve                      Value        Ref  Aortic valve peak velocity, S     1.53  m/sec  --------  Aortic peak gradient, S           9     mm Hg  --------  Velocity ratio, peak, LVOT/AV     0.71         --------  Aortic root                       Value        Ref  Aortic root ID                    4.0   cm     2.6 -                                                 4.0  Ascending aorta                   Value        Ref  Ascending aorta ID                3.4   cm     2.2 -                                                 3.8  Left atrium                       Value        Ref  LA volume, S                      36    ml     18 - 58  LA volume/bsa, S                  19    ml/m^2 16 - 34  LA volume, ES, 1-p A4C            33    ml     18 - 58  LA volume, ES, 1-p A2C            37    ml     18 - 58  LA volume, ES, A/L                40    ml     --------  LA volume/bsa, ES, A/L            21    ml/m^2 16 - 34  Mitral valve                      Value        Ref  Mitral E-wave peak velocity       0.65  m/sec  --------  Mitral A-wave peak velocity       0.69  m/sec  --------  Mitral deceleration time          222   ms     --------  Mitral E/A ratio, peak            0.9          --------  Pulmonary artery                  Value        Ref  PA pressure, S, DP                8     mm Hg  --------  Tricuspid valve                   Value        Ref  Tricuspid regurg peak             1.17  m/sec  <=2.8  velocity  Tricuspid peak RV-RA gradient     5     mm Hg  --------  Right  atrium                      Value        Ref  RA ID, S-I, ES, A4C               4.9   cm     3.4 -                                                 5.3  RA ID/bsa, S-I, ES, A4C           2.6   cm/m^2 1.8 -                                                 3.0  RA area, ES, A4C                  14    cm^2   10 - 18  RA volume, ES, 1-p A4C            34    ml     --------  RA volume/bsa, ES, 1-p A4C        18    ml/m^2 11 - 39  Systemic veins                    Value        Ref  Estimated CVP                     3     mm Hg  --------  Inferior vena cava                Value        Ref  ID                                0.9   cm     <=2.1  Right ventricle                   Value        Ref  TAPSE, 2D                         3.01  cm     >=1.70  TAPSE, MM                         3.01  cm     >=1.70  RV pressure, S, DP                8     mm Hg  --------  RV s', lateral                    20.4  cm/sec >=9.5 Legend: (L)  and  (H)  price values outside specified reference range. ---------------------------------------------------------------------------- Prepared and electronically signed by Rao Cantu 05/30/2024 07:28     XR HIP W OR WO PELVIS 2 OR 3 VIEWS, RIGHT (CPT=73502)    Result Date: 5/28/2024  PROCEDURE: XR HIP W OR WO PELVIS 2 OR 3 VIEWS, RIGHT (CPT=73502)  COMPARISON: [facility], XR HIP W OR WO PELVIS 3 OR 4 VIEWS, BILAT (CPT=73522), 3/18/2024, 12:38 PM.  INDICATIONS: Post right total hip arthroplasty.  TECHNIQUE: Two views (AP pelvis and AP right hip views) were obtained prior  FINDINGS:  BONES: Hip arthroplasty has been placed, with hardware in anatomic alignment. Postsurgical changes involving osteotomy of the femoral head and neck are present, and remaining osseous structures are grossly intact without evidence for acute fracture or malalignment.  Contralateral left hip shows severe osteoarthritis based on the presence of joint space narrowing, loss of normal sphericity of the femoral  head, subcortical cyst formation, marginal sclerosis with bone-on-bone contact in the acetabular joint, and osteophytes along the joint margin. SOFT TISSUES: There is soft tissue inflammation peripheral to the hip with loss of fat-muscle interface and subcutaneous emphysema, compatible with recent surgical procedure.  Drainage catheter extends into the intertrochanteric region. OTHER: Negative.          CONCLUSION: Postsurgical changes of right hip arthroplasty.  Dictated by (CST): Rajinder Cervantes MD on 5/28/2024 at 10:58 AM     Finalized by (CST): Rajinder Cervantes MD on 5/28/2024 at 11:00 AM          XR FLUOROSCOPY C-ARM TIME LESS THAN 1 HOUR (CPT=76000)    Result Date: 5/28/2024  PROCEDURE: XR FLUORO PHYSICIAN TIME< 1 HOUR (CPT=76000)  COMPARISON: None.  INDICATIONS: Right total hip arthroplasty, anterior approach under fluoroscopy.  TECHNIQUE: Intraoperative fluoroscopy.   FINDINGS: Fluoroscopic assistance was provided for the ordering physician. A radiologist was not present during this examination.         CONCLUSION: Fluoroscopic guidance as above.  24.8-seconds of fluoroscopy time were used. 2 fluoroscopic images as well as a 1 page-dose summary image are stored with this exam.  RADIATION DOSE (Dose Area Product):  0.57872-eVv*m^2.   Dictated by (CST): Rajinder Cervantes MD on 5/28/2024 at 10:35 AM     Finalized by (CST): Rajinder Cervantes MD on 5/28/2024 at 10:35 AM             Lab Results   Component Value Date    WBC 6.9 05/29/2024    HGB 8.1 (L) 05/30/2024    .0 05/29/2024      Lab Results   Component Value Date     (H) 05/29/2024    BUN 15 05/29/2024    CREATSERUM 0.96 05/29/2024        Assessment and Plan:  Diagnoses and all orders for this visit:    Orthopedic aftercare  -     Cancel: XR HIP + PELVIS MIN 4 VIEWS RIGHT (CPT=73503); Future        Assessment: Status post right total hip arthroplasty through an anterior approach    Plan: Mr. Patterson is progressing well following his surgery.  He will continue  with outpatient therapy and be diligent with his home exercise program.  He is scheduled to have his left hip replaced in a few weeks.  Will see him back about 3 weeks after that surgery for routine follow-up with a new x-ray of the left hip.  We will also check the progress on his right hip.    The above note was creating using Dragon speech recognition technology. Please excuse any typos.    This visit was performed under the supervision of Dr. Caesar Negro who formulated the treatment plan and decision making.

## 2024-06-24 NOTE — PAT NURSING NOTE
S/w Margaret from Dr Negro  ofc re: latest HH with Hg of 8.1 and Hct of 23.1 from 5/30 to inform MD. She will f/u and will inform MD.

## 2024-07-03 ENCOUNTER — LAB ENCOUNTER (OUTPATIENT)
Dept: LAB | Age: 64
End: 2024-07-03
Attending: ORTHOPAEDIC SURGERY
Payer: COMMERCIAL

## 2024-07-03 DIAGNOSIS — Z01.818 PRE-OP TESTING: ICD-10-CM

## 2024-07-03 LAB
ANTIBODY SCREEN: NEGATIVE
BASOPHILS # BLD AUTO: 0.06 X10(3) UL (ref 0–0.2)
BASOPHILS NFR BLD AUTO: 1 %
DEPRECATED RDW RBC AUTO: 44.4 FL (ref 35.1–46.3)
EOSINOPHIL # BLD AUTO: 0.45 X10(3) UL (ref 0–0.7)
EOSINOPHIL NFR BLD AUTO: 7.6 %
ERYTHROCYTE [DISTWIDTH] IN BLOOD BY AUTOMATED COUNT: 13.3 % (ref 11–15)
HCT VFR BLD AUTO: 37.8 %
HGB BLD-MCNC: 12.4 G/DL
IMM GRANULOCYTES # BLD AUTO: 0.01 X10(3) UL (ref 0–1)
IMM GRANULOCYTES NFR BLD: 0.2 %
LYMPHOCYTES # BLD AUTO: 1.48 X10(3) UL (ref 1–4)
LYMPHOCYTES NFR BLD AUTO: 24.9 %
MCH RBC QN AUTO: 29.7 PG (ref 26–34)
MCHC RBC AUTO-ENTMCNC: 32.8 G/DL (ref 31–37)
MCV RBC AUTO: 90.4 FL
MONOCYTES # BLD AUTO: 0.42 X10(3) UL (ref 0.1–1)
MONOCYTES NFR BLD AUTO: 7.1 %
NEUTROPHILS # BLD AUTO: 3.53 X10 (3) UL (ref 1.5–7.7)
NEUTROPHILS # BLD AUTO: 3.53 X10(3) UL (ref 1.5–7.7)
NEUTROPHILS NFR BLD AUTO: 59.2 %
PLATELET # BLD AUTO: 222 10(3)UL (ref 150–450)
RBC # BLD AUTO: 4.18 X10(6)UL
RH BLOOD TYPE: POSITIVE
WBC # BLD AUTO: 6 X10(3) UL (ref 4–11)

## 2024-07-03 PROCEDURE — 86901 BLOOD TYPING SEROLOGIC RH(D): CPT

## 2024-07-03 PROCEDURE — 85025 COMPLETE CBC W/AUTO DIFF WBC: CPT

## 2024-07-03 PROCEDURE — 36415 COLL VENOUS BLD VENIPUNCTURE: CPT

## 2024-07-03 PROCEDURE — 86850 RBC ANTIBODY SCREEN: CPT

## 2024-07-03 PROCEDURE — 86900 BLOOD TYPING SEROLOGIC ABO: CPT

## 2024-07-03 PROCEDURE — 87641 MR-STAPH DNA AMP PROBE: CPT

## 2024-07-04 LAB — MRSA DNA SPEC QL NAA+PROBE: NEGATIVE

## 2024-07-08 NOTE — H&P
[facility]  part of [facility]    History & Physical    Saturnino Patterson Patient Status:  Outpatient in a Bed    1960 MRN [id number]   Location [facility] OPERATING ROOM Attending Caesar Negro, *   Hosp Day # 0 PCP Rowdy Lang, DO     Date:  2024    History provided by:patient  Chief Complaint:   Left hip pain    HPI:   Saturnino Patterson is a(n) 64 year old male.  He presents with pain in his left hip.  He has known osteoarthritis in that hip.  He recently underwent right total hip arthroplasty and did very well.  He is anxious to proceed with surgery on his left hip.  He feels that it is holding him back in therapy.2    History     Past Medical History:    Anxiety state    Depression    High blood pressure    Osteoarthritis     Past Surgical History:   Procedure Laterality Date    Total hip replacement Right      Family History   Problem Relation Age of Onset    Heart Disorder Father      Social History:  Social History     Socioeconomic History    Marital status:    Tobacco Use    Smoking status: Former     Current packs/day: 0.00     Average packs/day: 0.5 packs/day for 5.0 years (2.5 ttl pk-yrs)     Types: Cigarettes     Start date:      Quit date:      Years since quittin.5    Smokeless tobacco: Never   Vaping Use    Vaping status: Never Used   Substance and Sexual Activity    Alcohol use: Never    Drug use: Never     Social Determinants of Health     Food Insecurity: No Food Insecurity (2024)    Food Insecurity     Food Insecurity: Never true   Transportation Needs: No Transportation Needs (2024)    Transportation Needs     Lack of Transportation: No   Housing Stability: Low Risk  (2024)    Housing Stability     Housing Instability: No     Allergies/Medications:   Allergies: No Known Allergies  No medications prior to admission.       Review of Systems:   Pertinent items are noted in HPI.    Physical Exam:   Vital Signs:  Height  5' 10\" (1.778 m), weight 155 lb (70.3 kg).     General appearance: alert, appears stated age and cooperative  Extremities: 10 degree left hip flexion contracture with further flexion to 90 degrees. 0 degrees of internal rotation.10 degrees external rotation on the left, about 10 degrees of abduction.  Pulses: 2+ and symmetric        Results:     Lab Results   Component Value Date    WBC 6.0 07/03/2024    HGB 12.4 (L) 07/03/2024    HCT 37.8 (L) 07/03/2024    .0 07/03/2024    CREATSERUM 0.96 05/29/2024    BUN 15 05/29/2024     05/29/2024    K 4.1 05/29/2024     05/29/2024    CO2 28.0 05/29/2024     (H) 05/29/2024    CA 8.2 (L) 05/29/2024    ALB 4.3 05/14/2024    ALKPHO 82 05/14/2024    BILT 0.8 05/14/2024    TP 7.1 05/14/2024    AST 21 05/14/2024    ALT 21 05/14/2024    TSH 2.311 05/29/2024       No results found.        Assessment/Plan:   Saturnino is scheduled for a left total hip arthroplasty through an anterior approach.  He is doing very well following right hip replacement done in May.  We went over the surgery and the expected risks and benefits.  Answered all of his questions.  We will proceed with surgery as scheduled.      NANCY SANZ PA-C  7/8/2024

## 2024-07-09 ENCOUNTER — APPOINTMENT (OUTPATIENT)
Dept: GENERAL RADIOLOGY | Facility: HOSPITAL | Age: 64
End: 2024-07-09
Attending: PHYSICIAN ASSISTANT
Payer: COMMERCIAL

## 2024-07-09 ENCOUNTER — ANESTHESIA (OUTPATIENT)
Dept: SURGERY | Facility: HOSPITAL | Age: 64
End: 2024-07-09
Payer: COMMERCIAL

## 2024-07-09 ENCOUNTER — ANESTHESIA EVENT (OUTPATIENT)
Dept: SURGERY | Facility: HOSPITAL | Age: 64
End: 2024-07-09
Payer: COMMERCIAL

## 2024-07-09 ENCOUNTER — HOSPITAL ENCOUNTER (OUTPATIENT)
Facility: HOSPITAL | Age: 64
Discharge: HOME HEALTH | End: 2024-07-10
Attending: ORTHOPAEDIC SURGERY | Admitting: ORTHOPAEDIC SURGERY
Payer: COMMERCIAL

## 2024-07-09 ENCOUNTER — APPOINTMENT (OUTPATIENT)
Dept: GENERAL RADIOLOGY | Facility: HOSPITAL | Age: 64
End: 2024-07-09
Attending: ORTHOPAEDIC SURGERY
Payer: COMMERCIAL

## 2024-07-09 DIAGNOSIS — G89.18 POSTOPERATIVE PAIN: ICD-10-CM

## 2024-07-09 DIAGNOSIS — M16.12 PRIMARY OSTEOARTHRITIS OF LEFT HIP: ICD-10-CM

## 2024-07-09 DIAGNOSIS — Z01.818 PRE-OP TESTING: Primary | ICD-10-CM

## 2024-07-09 LAB
DEPRECATED RDW RBC AUTO: 46 FL (ref 35.1–46.3)
ERYTHROCYTE [DISTWIDTH] IN BLOOD BY AUTOMATED COUNT: 13.4 % (ref 11–15)
HCT VFR BLD AUTO: 33 %
HGB BLD-MCNC: 10.7 G/DL
MCH RBC QN AUTO: 30.1 PG (ref 26–34)
MCHC RBC AUTO-ENTMCNC: 32.4 G/DL (ref 31–37)
MCV RBC AUTO: 92.7 FL
PLATELET # BLD AUTO: 195 10(3)UL (ref 150–450)
RBC # BLD AUTO: 3.56 X10(6)UL
WBC # BLD AUTO: 7.4 X10(3) UL (ref 4–11)

## 2024-07-09 PROCEDURE — 0SRB0JA REPLACEMENT OF LEFT HIP JOINT WITH SYNTHETIC SUBSTITUTE, UNCEMENTED, OPEN APPROACH: ICD-10-PCS | Performed by: ORTHOPAEDIC SURGERY

## 2024-07-09 PROCEDURE — 73502 X-RAY EXAM HIP UNI 2-3 VIEWS: CPT | Performed by: PHYSICIAN ASSISTANT

## 2024-07-09 PROCEDURE — 76000 FLUOROSCOPY <1 HR PHYS/QHP: CPT | Performed by: ORTHOPAEDIC SURGERY

## 2024-07-09 PROCEDURE — 99214 OFFICE O/P EST MOD 30 MIN: CPT | Performed by: HOSPITALIST

## 2024-07-09 DEVICE — CANCELLOUS BONE SCREW Ø 6.5 L 30
Type: IMPLANTABLE DEVICE | Site: HIP | Status: FUNCTIONAL
Brand: MPACT EXTENSION

## 2024-07-09 DEVICE — FLAT PE HC LINER Ø 36 / J
Type: IMPLANTABLE DEVICE | Site: HIP | Status: FUNCTIONAL
Brand: MPACT ACETABULAR SYSTEM

## 2024-07-09 DEVICE — ACETABULAR SHELL Ø68 MULTI HOLES
Type: IMPLANTABLE DEVICE | Site: HIP | Status: FUNCTIONAL
Brand: MPACT ACETABULAR SYSTEM

## 2024-07-09 DEVICE — AMISTEM-P LAT STEM #6
Type: IMPLANTABLE DEVICE | Site: HIP | Status: FUNCTIONAL
Brand: AMISTEM-P

## 2024-07-09 DEVICE — HIP REPLACEMENT WITH CERAMIC HEAD: Type: IMPLANTABLE DEVICE | Site: HIP

## 2024-07-09 RX ORDER — ACETAMINOPHEN 500 MG
1000 TABLET ORAL ONCE
Status: COMPLETED | OUTPATIENT
Start: 2024-07-09 | End: 2024-07-09

## 2024-07-09 RX ORDER — NALOXONE HYDROCHLORIDE 0.4 MG/ML
0.08 INJECTION, SOLUTION INTRAMUSCULAR; INTRAVENOUS; SUBCUTANEOUS
Status: DISCONTINUED | OUTPATIENT
Start: 2024-07-09 | End: 2024-07-10

## 2024-07-09 RX ORDER — SODIUM CHLORIDE, SODIUM LACTATE, POTASSIUM CHLORIDE, CALCIUM CHLORIDE 600; 310; 30; 20 MG/100ML; MG/100ML; MG/100ML; MG/100ML
INJECTION, SOLUTION INTRAVENOUS CONTINUOUS
Status: DISCONTINUED | OUTPATIENT
Start: 2024-07-09 | End: 2024-07-09 | Stop reason: HOSPADM

## 2024-07-09 RX ORDER — DIPHENHYDRAMINE HYDROCHLORIDE 50 MG/ML
12.5 INJECTION INTRAMUSCULAR; INTRAVENOUS EVERY 4 HOURS PRN
Status: DISCONTINUED | OUTPATIENT
Start: 2024-07-09 | End: 2024-07-10

## 2024-07-09 RX ORDER — HYDROMORPHONE HYDROCHLORIDE 1 MG/ML
0.4 INJECTION, SOLUTION INTRAMUSCULAR; INTRAVENOUS; SUBCUTANEOUS EVERY 5 MIN PRN
Status: DISCONTINUED | OUTPATIENT
Start: 2024-07-09 | End: 2024-07-09 | Stop reason: HOSPADM

## 2024-07-09 RX ORDER — HYDROMORPHONE HYDROCHLORIDE 1 MG/ML
0.6 INJECTION, SOLUTION INTRAMUSCULAR; INTRAVENOUS; SUBCUTANEOUS
Status: DISCONTINUED | OUTPATIENT
Start: 2024-07-09 | End: 2024-07-10

## 2024-07-09 RX ORDER — BISACODYL 10 MG
10 SUPPOSITORY, RECTAL RECTAL
Status: DISCONTINUED | OUTPATIENT
Start: 2024-07-09 | End: 2024-07-10

## 2024-07-09 RX ORDER — ASPIRIN 325 MG
325 TABLET, DELAYED RELEASE (ENTERIC COATED) ORAL 2 TIMES DAILY
Status: DISCONTINUED | OUTPATIENT
Start: 2024-07-09 | End: 2024-07-10

## 2024-07-09 RX ORDER — ALPRAZOLAM 0.25 MG/1
0.25 TABLET ORAL 3 TIMES DAILY PRN
Status: DISCONTINUED | OUTPATIENT
Start: 2024-07-09 | End: 2024-07-10

## 2024-07-09 RX ORDER — DIPHENHYDRAMINE HCL 25 MG
25 CAPSULE ORAL EVERY 4 HOURS PRN
Status: DISCONTINUED | OUTPATIENT
Start: 2024-07-09 | End: 2024-07-10

## 2024-07-09 RX ORDER — HYDROMORPHONE HYDROCHLORIDE 1 MG/ML
0.4 INJECTION, SOLUTION INTRAMUSCULAR; INTRAVENOUS; SUBCUTANEOUS EVERY 2 HOUR PRN
Status: DISCONTINUED | OUTPATIENT
Start: 2024-07-09 | End: 2024-07-10

## 2024-07-09 RX ORDER — LOSARTAN POTASSIUM 25 MG/1
25 TABLET ORAL DAILY
Status: DISCONTINUED | OUTPATIENT
Start: 2024-07-09 | End: 2024-07-10

## 2024-07-09 RX ORDER — FERROUS SULFATE 325(65) MG
325 TABLET, DELAYED RELEASE (ENTERIC COATED) ORAL
Status: DISCONTINUED | OUTPATIENT
Start: 2024-07-10 | End: 2024-07-10

## 2024-07-09 RX ORDER — TRANEXAMIC ACID 10 MG/ML
INJECTION, SOLUTION INTRAVENOUS AS NEEDED
Status: DISCONTINUED | OUTPATIENT
Start: 2024-07-09 | End: 2024-07-09 | Stop reason: SURG

## 2024-07-09 RX ORDER — FAMOTIDINE 10 MG/ML
20 INJECTION, SOLUTION INTRAVENOUS 2 TIMES DAILY
Status: DISCONTINUED | OUTPATIENT
Start: 2024-07-09 | End: 2024-07-10

## 2024-07-09 RX ORDER — HYDROCODONE BITARTRATE AND ACETAMINOPHEN 7.5; 325 MG/1; MG/1
1 TABLET ORAL EVERY 6 HOURS PRN
Status: DISCONTINUED | OUTPATIENT
Start: 2024-07-09 | End: 2024-07-10

## 2024-07-09 RX ORDER — LIDOCAINE HYDROCHLORIDE 10 MG/ML
INJECTION, SOLUTION EPIDURAL; INFILTRATION; INTRACAUDAL; PERINEURAL AS NEEDED
Status: DISCONTINUED | OUTPATIENT
Start: 2024-07-09 | End: 2024-07-09 | Stop reason: SURG

## 2024-07-09 RX ORDER — BUPIVACAINE HYDROCHLORIDE 7.5 MG/ML
INJECTION, SOLUTION INTRASPINAL
Status: COMPLETED | OUTPATIENT
Start: 2024-07-09 | End: 2024-07-09

## 2024-07-09 RX ORDER — HALOPERIDOL 5 MG/ML
0.5 INJECTION INTRAMUSCULAR ONCE AS NEEDED
Status: ACTIVE | OUTPATIENT
Start: 2024-07-09 | End: 2024-07-09

## 2024-07-09 RX ORDER — ONDANSETRON 2 MG/ML
4 INJECTION INTRAMUSCULAR; INTRAVENOUS EVERY 6 HOURS PRN
Status: DISCONTINUED | OUTPATIENT
Start: 2024-07-09 | End: 2024-07-10

## 2024-07-09 RX ORDER — SENNOSIDES 8.6 MG
17.2 TABLET ORAL NIGHTLY
Status: DISCONTINUED | OUTPATIENT
Start: 2024-07-09 | End: 2024-07-10

## 2024-07-09 RX ORDER — MIDAZOLAM HYDROCHLORIDE 1 MG/ML
INJECTION INTRAMUSCULAR; INTRAVENOUS AS NEEDED
Status: DISCONTINUED | OUTPATIENT
Start: 2024-07-09 | End: 2024-07-09 | Stop reason: SURG

## 2024-07-09 RX ORDER — HYDROCODONE BITARTRATE AND ACETAMINOPHEN 7.5; 325 MG/1; MG/1
2 TABLET ORAL EVERY 6 HOURS PRN
Status: DISCONTINUED | OUTPATIENT
Start: 2024-07-09 | End: 2024-07-10

## 2024-07-09 RX ORDER — MORPHINE SULFATE 1 MG/ML
INJECTION, SOLUTION EPIDURAL; INTRATHECAL; INTRAVENOUS
Status: COMPLETED | OUTPATIENT
Start: 2024-07-09 | End: 2024-07-09

## 2024-07-09 RX ORDER — PROCHLORPERAZINE EDISYLATE 5 MG/ML
5 INJECTION INTRAMUSCULAR; INTRAVENOUS ONCE AS NEEDED
Status: ACTIVE | OUTPATIENT
Start: 2024-07-09 | End: 2024-07-09

## 2024-07-09 RX ORDER — SODIUM CHLORIDE 9 MG/ML
INJECTION, SOLUTION INTRAVENOUS CONTINUOUS
Status: DISCONTINUED | OUTPATIENT
Start: 2024-07-09 | End: 2024-07-10

## 2024-07-09 RX ORDER — DOCUSATE SODIUM 100 MG/1
100 CAPSULE, LIQUID FILLED ORAL 2 TIMES DAILY
Status: DISCONTINUED | OUTPATIENT
Start: 2024-07-09 | End: 2024-07-10

## 2024-07-09 RX ORDER — NALBUPHINE HYDROCHLORIDE 10 MG/ML
2.5 INJECTION, SOLUTION INTRAMUSCULAR; INTRAVENOUS; SUBCUTANEOUS EVERY 4 HOURS PRN
Status: DISCONTINUED | OUTPATIENT
Start: 2024-07-09 | End: 2024-07-10

## 2024-07-09 RX ORDER — FAMOTIDINE 20 MG/1
20 TABLET, FILM COATED ORAL 2 TIMES DAILY
Status: DISCONTINUED | OUTPATIENT
Start: 2024-07-09 | End: 2024-07-10

## 2024-07-09 RX ORDER — ONDANSETRON 2 MG/ML
INJECTION INTRAMUSCULAR; INTRAVENOUS AS NEEDED
Status: DISCONTINUED | OUTPATIENT
Start: 2024-07-09 | End: 2024-07-09 | Stop reason: SURG

## 2024-07-09 RX ORDER — SODIUM CHLORIDE, SODIUM LACTATE, POTASSIUM CHLORIDE, CALCIUM CHLORIDE 600; 310; 30; 20 MG/100ML; MG/100ML; MG/100ML; MG/100ML
INJECTION, SOLUTION INTRAVENOUS CONTINUOUS
Status: DISCONTINUED | OUTPATIENT
Start: 2024-07-09 | End: 2024-07-10

## 2024-07-09 RX ORDER — HYDROMORPHONE HYDROCHLORIDE 1 MG/ML
0.4 INJECTION, SOLUTION INTRAMUSCULAR; INTRAVENOUS; SUBCUTANEOUS
Status: DISCONTINUED | OUTPATIENT
Start: 2024-07-09 | End: 2024-07-10

## 2024-07-09 RX ORDER — ONDANSETRON 2 MG/ML
4 INJECTION INTRAMUSCULAR; INTRAVENOUS ONCE AS NEEDED
Status: ACTIVE | OUTPATIENT
Start: 2024-07-09 | End: 2024-07-09

## 2024-07-09 RX ORDER — HYDROMORPHONE HYDROCHLORIDE 1 MG/ML
0.2 INJECTION, SOLUTION INTRAMUSCULAR; INTRAVENOUS; SUBCUTANEOUS EVERY 2 HOUR PRN
Status: DISCONTINUED | OUTPATIENT
Start: 2024-07-09 | End: 2024-07-10

## 2024-07-09 RX ORDER — HYDROMORPHONE HYDROCHLORIDE 1 MG/ML
0.6 INJECTION, SOLUTION INTRAMUSCULAR; INTRAVENOUS; SUBCUTANEOUS EVERY 5 MIN PRN
Status: DISCONTINUED | OUTPATIENT
Start: 2024-07-09 | End: 2024-07-09 | Stop reason: HOSPADM

## 2024-07-09 RX ORDER — MORPHINE SULFATE 4 MG/ML
4 INJECTION, SOLUTION INTRAMUSCULAR; INTRAVENOUS EVERY 10 MIN PRN
Status: DISCONTINUED | OUTPATIENT
Start: 2024-07-09 | End: 2024-07-09 | Stop reason: HOSPADM

## 2024-07-09 RX ORDER — ENEMA 19; 7 G/133ML; G/133ML
1 ENEMA RECTAL ONCE AS NEEDED
Status: DISCONTINUED | OUTPATIENT
Start: 2024-07-09 | End: 2024-07-10

## 2024-07-09 RX ORDER — ACETAMINOPHEN 325 MG/1
650 TABLET ORAL EVERY 6 HOURS PRN
Status: DISCONTINUED | OUTPATIENT
Start: 2024-07-09 | End: 2024-07-10

## 2024-07-09 RX ORDER — LIDOCAINE HYDROCHLORIDE 10 MG/ML
INJECTION, SOLUTION INFILTRATION; PERINEURAL
Status: COMPLETED | OUTPATIENT
Start: 2024-07-09 | End: 2024-07-09

## 2024-07-09 RX ORDER — MORPHINE SULFATE 10 MG/ML
6 INJECTION, SOLUTION INTRAMUSCULAR; INTRAVENOUS EVERY 10 MIN PRN
Status: DISCONTINUED | OUTPATIENT
Start: 2024-07-09 | End: 2024-07-09 | Stop reason: HOSPADM

## 2024-07-09 RX ORDER — CELECOXIB 200 MG/1
400 CAPSULE ORAL ONCE
Status: COMPLETED | OUTPATIENT
Start: 2024-07-09 | End: 2024-07-09

## 2024-07-09 RX ORDER — POLYETHYLENE GLYCOL 3350 17 G/17G
17 POWDER, FOR SOLUTION ORAL DAILY PRN
Status: DISCONTINUED | OUTPATIENT
Start: 2024-07-09 | End: 2024-07-10

## 2024-07-09 RX ORDER — MORPHINE SULFATE 4 MG/ML
2 INJECTION, SOLUTION INTRAMUSCULAR; INTRAVENOUS EVERY 10 MIN PRN
Status: DISCONTINUED | OUTPATIENT
Start: 2024-07-09 | End: 2024-07-09 | Stop reason: HOSPADM

## 2024-07-09 RX ORDER — DIPHENHYDRAMINE HYDROCHLORIDE 50 MG/ML
25 INJECTION INTRAMUSCULAR; INTRAVENOUS ONCE AS NEEDED
Status: ACTIVE | OUTPATIENT
Start: 2024-07-09 | End: 2024-07-09

## 2024-07-09 RX ORDER — DEXAMETHASONE SODIUM PHOSPHATE 4 MG/ML
VIAL (ML) INJECTION AS NEEDED
Status: DISCONTINUED | OUTPATIENT
Start: 2024-07-09 | End: 2024-07-09 | Stop reason: SURG

## 2024-07-09 RX ORDER — HYDROMORPHONE HYDROCHLORIDE 1 MG/ML
0.2 INJECTION, SOLUTION INTRAMUSCULAR; INTRAVENOUS; SUBCUTANEOUS EVERY 5 MIN PRN
Status: DISCONTINUED | OUTPATIENT
Start: 2024-07-09 | End: 2024-07-09 | Stop reason: HOSPADM

## 2024-07-09 RX ORDER — NALOXONE HYDROCHLORIDE 0.4 MG/ML
0.08 INJECTION, SOLUTION INTRAMUSCULAR; INTRAVENOUS; SUBCUTANEOUS AS NEEDED
Status: DISCONTINUED | OUTPATIENT
Start: 2024-07-09 | End: 2024-07-09 | Stop reason: HOSPADM

## 2024-07-09 RX ADMIN — DEXAMETHASONE SODIUM PHOSPHATE 4 MG: 4 MG/ML VIAL (ML) INJECTION at 10:55:00

## 2024-07-09 RX ADMIN — BUPIVACAINE HYDROCHLORIDE 1.6 ML: 7.5 INJECTION, SOLUTION INTRASPINAL at 10:50:00

## 2024-07-09 RX ADMIN — LIDOCAINE HYDROCHLORIDE 50 MG: 10 INJECTION, SOLUTION EPIDURAL; INFILTRATION; INTRACAUDAL; PERINEURAL at 10:55:00

## 2024-07-09 RX ADMIN — ONDANSETRON 4 MG: 2 INJECTION INTRAMUSCULAR; INTRAVENOUS at 10:55:00

## 2024-07-09 RX ADMIN — MIDAZOLAM HYDROCHLORIDE 2 MG: 1 INJECTION INTRAMUSCULAR; INTRAVENOUS at 10:46:00

## 2024-07-09 RX ADMIN — TRANEXAMIC ACID 1000 MG: 10 INJECTION, SOLUTION INTRAVENOUS at 11:02:00

## 2024-07-09 RX ADMIN — LIDOCAINE HYDROCHLORIDE 5 ML: 10 INJECTION, SOLUTION INFILTRATION; PERINEURAL at 10:50:00

## 2024-07-09 RX ADMIN — MORPHINE SULFATE 0.3 MG: 1 INJECTION, SOLUTION EPIDURAL; INTRATHECAL; INTRAVENOUS at 10:50:00

## 2024-07-09 NOTE — OPERATIVE REPORT
OPERATIVE REPORT     PREOPERATIVE DIAGNOSIS:  Osteoarthritis, left hip.  POSTOPERATIVE DIAGNOSIS:  Osteoarthritis, left hip.    PROCEDURE:  left total hip arthroplasty via anterior approach with C-arm control.     ASSISTANT:  Thang Perkins PA-C.       INDICATIONS:  The patient is a 64 year old male with advanced osteoarthritis of the left hip that has not responded to conservative management.  After discussion of the options for treatment, he requested the above procedure.  Our discussion included, but was not limited to, the potential risks of anesthetic complication, infection, DVT or PE, leg length inequality, periprosthetic fracture, injuries to local nerves or blood vessels, bleeding requiring transfusion, periprosthetic fracture, as well as the risk of unanticipated perioperative medical or orthopedic complications.  Written consent was obtained.     OPERATIVE TECHNIQUE:  The patient was brought to the operating room and placed under spinal anesthesia.  Ancef, tranexamic acid, and vancomycin were administered prophylactically.  A time-out was performed.  The left hip and lower extremity were prepped and draped in the usual sterile fashion.  An oblique incision was made just distal and lateral to the ASIS and carried to the fascia over the TFL.  The fascia was divided in line with the skin incision.  The TFL was reflected posteriorly within its sheath, and the rectus was reflected anteriorly.  The anterior circumflex femoral vessels were identified, coagulated, and ligated, and an anterior capsulotomy was performed.  A femoral neck osteotomy was made at the appropriate level and orientation.  The femoral head was removed.  A Charnley retractor was placed within the hip capsule to facilitate exposure, and the acetabular labrum was excised.  The acetabulum was reamed sequentially to 68 mm, at which point good bleeding subchondral bone was obtained.  Depth of reaming as well as inclination and version were  verified with the C-arm.  A Medacta Mpact multi-hole acetabulum was inserted in the appropriate version and inclination and obtained a good press-fit.  Two  screws were placed in the ilium for supplementary fixation and obtained excellent purchase.  A highly cross-linked flat 36mm ID liner was locked into the shell, and attention was turned to the femur.       The pubofemoral and ischiofemoral ligaments were released, and the femur was externally rotated.  With no traction applied, the leg was extended externally, rotated, and adducted.  Standard retractors were placed around the proximal femur, and a box osteotome was used to open the femoral canal, followed by the canal opening broach.  The femur was then broached up to accept a Medacta AMIS size 6 LAT broach, which obtained excellent press-fit with excellent rotational stability.  The standard trial neck was applied, and a trial reduction was performed with a 36 mm +4 head.  Check x-rays were taken with the C-arm and overlaid with the preoperative views, verifying appropriate leg length and offset.  The trial components were then removed, and the actual #6 LAT stem was inserted in the femur.  An excellent press-fit was obtained with good rotational stability.  The  ceramic head matching the trial size was applied to a clean dry trunnion, and the hip was reduced.       A final radiograph was taken and verified excellent re-creation of leg length and offset.  The wound was irrigated and closed in routine fashion in layers.  Sponge and instrument counts were correct at the end of the procedure.  Estimated blood loss was 300 mL.  There were no complications.  The routine specimens were sent to Pathology.  The patient was stable under the care of Anesthesia at the completion of the procedure.     LATANYA HA MD 7/9/2024

## 2024-07-09 NOTE — INTERVAL H&P NOTE
Pre-op Diagnosis: Primary osteoarthritis of left hip [M16.12]    The above referenced H&P was reviewed by LATANYA HA MD on 7/9/2024, the patient was examined and no significant changes have occurred in the patient's condition since the H&P was performed.  I discussed with the patient and/or legal representative the potential benefits, risks and side effects of this procedure; the likelihood of the patient achieving goals; and potential problems that might occur during recuperation.  I discussed reasonable alternatives to the procedure, including risks, benefits and side effects related to the alternatives and risks related to not receiving this procedure.  We will proceed with procedure as planned.

## 2024-07-09 NOTE — ANESTHESIA PREPROCEDURE EVALUATION
Anesthesia PreOp Note    HPI:     Saturnino Patterson is a 64 year old male who presents for preoperative consultation requested by: Caesar Negro MD    Date of Surgery: 7/9/2024    Procedure(s):  Left total hip arthroplasty, anterior approach  Indication: Primary osteoarthritis of left hip [M16.12]    Relevant Problems   No relevant active problems       NPO:  Last Liquid Consumption Date: 07/08/24  Last Liquid Consumption Time: 1800  Last Solid Consumption Date: 07/08/24  Last Solid Consumption Time: 1730  Last Liquid Consumption Date: 07/08/24          History Review:  Patient Active Problem List    Diagnosis Date Noted    Orthopedic aftercare 06/24/2024    Postoperative pain 05/30/2024    Pre-op testing 05/29/2024    Essential hypertension 05/28/2024    Primary osteoarthritis of right hip 05/22/2024    Depressive disorder 04/17/2019    Generalized anxiety disorder 04/17/2019    Vitamin D deficiency 01/21/2019    Seasonal affective disorder (HCC) 03/01/2016    Insomnia 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 30 tablet 5 Past Week    PARoxetine 30 MG Oral Tab TAKE 1 TABLET BY MOUTH EVERY DAY 90 tablet 3 7/8/2024    aspirin 325 MG Oral Tab EC Take 1 tablet (325 mg total) by mouth in the morning and 1 tablet (325 mg total) before bedtime. 60 tablet 0     ferrous sulfate 325 (65 FE) MG Oral Tab EC Take 1 tablet (325 mg total) by mouth daily with breakfast. 20 tablet 0 Past Week    HYDROcodone-acetaminophen 7.5-325 MG Oral Tab Take 1 tablet by mouth every 6 (six) hours as needed. (Patient not taking: Reported on 6/24/2024) 40 tablet 0     ALPRAZolam 0.25 MG Oral Tab Take  1 tablet (0.25 mg total) by mouth 3 (three) times daily as needed. 20 tablet 0 More than a month     Current Facility-Administered Medications Ordered in Epic   Medication Dose Route Frequency Provider Last Rate Last Admin    lactated ringers infusion   Intravenous Continuous Caesar Negro MD        acetaminophen (Tylenol Extra Strength) tab 1,000 mg  1,000 mg Oral Once Caesar Negro MD        celecoxib (CeleBREX) cap 400 mg  400 mg Oral Once Caesar Negro MD        ceFAZolin (Ancef) 2g in 10mL IV syringe premix  2 g Intravenous Once Caesar Negro MD        vancomycin (Vancocin) 1,000 mg in sodium chloride 0.9% 250 mL IVPB-ADDV  15 mg/kg Intravenous Once Caesar Negro MD         No current [facility]-ordered outpatient medications on file.       No Known Allergies    Family History   Problem Relation Age of Onset    Heart Disorder Father      Social History     Socioeconomic History    Marital status:    Tobacco Use    Smoking status: Former     Current packs/day: 0.00     Average packs/day: 0.5 packs/day for 5.0 years (2.5 ttl pk-yrs)     Types: Cigarettes     Start date:      Quit date:      Years since quittin.5    Smokeless tobacco: Never   Vaping Use    Vaping status: Never Used   Substance and Sexual Activity    Alcohol use: Never    Drug use: Never       Available pre-op labs reviewed.  Lab Results   Component Value Date    WBC 6.0 2024    RBC 4.18 2024    HGB 12.4 (L) 2024    HCT 37.8 (L) 2024    MCV 90.4 2024    MCH 29.7 2024    MCHC 32.8 2024    RDW 13.3 2024    .0 2024     Lab Results   Component Value Date     2024    K 4.1 2024     2024    CO2 28.0 2024    BUN 15 2024    CREATSERUM 0.96 2024     (H) 2024    PGLU 150 (H) 2024    CA 8.2 (L) 2024          Vital Signs:  Body mass index is 22.24 kg/m².   height is  1.778 m (5' 10\") and weight is 70.3 kg (155 lb).   Vitals:    06/28/24 0910   Weight: 70.3 kg (155 lb)   Height: 1.778 m (5' 10\")        Anesthesia Evaluation     Patient summary reviewed and Nursing notes reviewed    No history of anesthetic complications   Airway   Mallampati: I  TM distance: >3 FB  Neck ROM: full  Dental      Pulmonary - normal exam   Cardiovascular - normal exam  (+) hypertension    ROS comment: 1. Left ventricle: The cavity size was normal. Wall thickness was normal.      Systolic function was vigorous. The estimated ejection fraction was      65-70%, by visual assessment. No diagnostic evidence for regional wall      motion abnormalities. Left ventricular diastolic function parameters were      normal.   2. Aortic root: The aortic root was borderline dilated and 4.0cm diameter.   3. Pulmonary arteries: Systolic pressure was within the normal range.       Neuro/Psych    (+)  anxiety/panic attacks,  depression      GI/Hepatic/Renal      Endo/Other    (+) arthritis  Abdominal                  Anesthesia Plan:   ASA:  2  Plan:   Spinal  Informed Consent Plan and Risks Discussed With:  Patient      I have informed Saturnino JOLLY Leonardo and/or legal guardian or family member of the nature of the anesthetic plan, benefits, risks including possible dental damage if relevant, major complications, and any alternative forms of anesthetic management.   All of the patient's questions were answered to the best of my ability. The patient desires the anesthetic management as planned.  Seymour Barroso MD  7/9/2024 9:09 AM  Present on Admission:  **None**

## 2024-07-09 NOTE — ANESTHESIA PROCEDURE NOTES
Spinal Block    Date/Time: 7/9/2024 10:50 AM    Performed by: Seymour Barroso MD  Authorized by: Seymour Barroso MD      General Information and Staff    Start Time:  7/9/2024 10:50 AM  End Time:  7/9/2024 10:52 AM  Anesthesiologist:  Seymour Barroso MD  Performed by:  Anesthesiologist  Patient Location:  OR  Site identification: surface landmarks    Preanesthetic Checklist: patient identified, IV checked, risks and benefits discussed, monitors and equipment checked, pre-op evaluation, timeout performed, anesthesia consent and sterile technique used      Procedure Details    Patient Position:  Sitting  Prep: ChloraPrep    Monitoring:  Cardiac monitor  Approach:  Midline  Location:  L3-4  Injection Technique:  Single-shot    Needle    Needle Type:  Pencil-tip  Needle Gauge:  24 G  Needle Length:  3.5 in    Assessment    Sensory Level:   Events: clear CSF, CSF aspirated, well tolerated and blood negative      Additional Comments

## 2024-07-09 NOTE — ANESTHESIA POSTPROCEDURE EVALUATION
Patient: Saturnino Patterson    Procedure Summary       Date: 07/09/24 Room / Location: [facility] MAIN OR  / [facility] MAIN OR    Anesthesia Start: 1045 Anesthesia Stop: 1314    Procedure: Left total hip arthroplasty, anterior approach (Left: Hip) Diagnosis:       Primary osteoarthritis of left hip      (Primary osteoarthritis of left hip [M16.12])    Surgeons: Caesar Negro MD Anesthesiologist: Seymour Barroso MD    Anesthesia Type: spinal ASA Status: 2            Anesthesia Type: spinal    Vitals Value Taken Time   /90 07/09/24 1414   Temp 97.8 °F (36.6 °C) 07/09/24 1315   Pulse 53 07/09/24 1415   Resp 11 07/09/24 1415   SpO2 100 % 07/09/24 1415   Vitals shown include unfiled device data.    [facility] AN Post Evaluation:   Patient Evaluated in PACU  Patient Participation: complete - patient participated  Level of Consciousness: awake  Pain Management: adequate  Airway Patency:patent  Yes    Nausea/Vomiting: none  Cardiovascular Status: acceptable  Respiratory Status: acceptable  Postoperative Hydration acceptable      Seymour Barroso MD  7/9/2024 2:15 PM

## 2024-07-09 NOTE — BRIEF OP NOTE
Pre-Operative Diagnosis: Primary osteoarthritis of left hip [M16.12]     Post-Operative Diagnosis: Primary osteoarthritis of left hip [M16.12]      Procedure Performed:   Left total hip arthroplasty, anterior approach    Surgeons and Role:     * Latanya Negro MD - Primary    Assistant(s):  Surgical Assistant.: Ninfa Obrien  PA: Thang Perkins PA-C     Surgical Findings: OA     Specimen: path     Estimated Blood Loss: 300ml      LATANYA NEGRO MD  7/9/2024  12:48 PM

## 2024-07-09 NOTE — CONSULTS
[facility]    PATIENT'S NAME: HIPOLITO VIDAL   ATTENDING PHYSICIAN: Caesar Negro MD   CONSULTING PHYSICIAN: Meme Matthews MD   PATIENT ACCOUNT#:   [id number]    LOCATION:  [address]  MEDICAL RECORD #:   [id number]       YOB: 1960  ADMISSION DATE:       07/09/2024      CONSULT DATE:  07/09/2024    REPORT OF CONSULTATION    REASON FOR CONSULTATION:  Post left total hip arthroplasty.    HISTORY OF PRESENT ILLNESS:  Patient is a 64-year-old  male with chronic left hip pain and underlying severe primary osteoarthritis, failed outpatient conservative medical management options.  Scheduled today for above-mentioned procedure by his orthopedic surgeon, Dr. Caesar Negro.  Preoperatively, a spinal block.  Postoperatively, transferred to PACU for further monitoring.    PAST MEDICAL HISTORY:  Generalized osteoarthritis, anxiety, diabetes mellitus type 2, and hypertension.    PAST SURGICAL HISTORY:  Right total hip arthroplasty.    MEDICATIONS:  Please see medication reconciliation list.    ALLERGIES:  No known drug allergies.    SOCIAL HISTORY:  Ex-tobacco user.  No current tobacco, alcohol, or drug use.  Lives with his family.  Independent in his basic activities of daily living.    FAMILY HISTORY:  Positive for heart disease.    REVIEW OF SYSTEMS:  Currently resting in bed.  No left hip pain.  No chest pain.  No shortness of breath.  Other 12-point review of systems is negative.      PHYSICAL EXAMINATION:    GENERAL:  Alert.  Oriented to time, place, and person.  No acute distress.  VITAL SIGNS:  Temperature 97.8, pulse 60, respiratory rate 10, blood pressure 132/88, pulse ox 100% on room air.  HEENT:  Atraumatic.  Oropharynx clear.  Moist mucous membranes.  Ear, nose:  Normal.  Eyes:  Anicteric sclerae.  NECK:  Supple.  No lymphadenopathy.  Trachea midline.  Full range of motion.  LUNGS:  Clear to auscultation bilaterally.  Normal respiratory effort.  HEART:  Regular  rate and rhythm.  S1 and S2 auscultated.  No murmur.  ABDOMEN:  Soft, nondistended.  No tenderness.  Positive bowel sounds.  EXTREMITIES:  No peripheral edema, clubbing, or cyanosis.  LEFT HIP:  Prevena wound VAC dressing.  NEUROLOGIC:  Decreased sensation and muscle movement in both lower extremities post spinal block.  No other focal findings.     ASSESSMENT AND PLAN:    1.   Left hip primary osteoarthritis status post left total hip arthroplasty.  Pain control.  Neurovascular checks.  Spinal block.  Aspirin for DVT prophylaxis.  Monitor surgical wound and drain.  Physical and occupational therapy.  2.   Essential hypertension.  Continue home medications and monitor.    Dictated By Meme Matthews MD  d: 07/09/2024 14:56:00  t: 07/09/2024 15:57:33  Job [id number]  FB/

## 2024-07-10 VITALS
TEMPERATURE: 98 F | DIASTOLIC BLOOD PRESSURE: 77 MMHG | BODY MASS INDEX: 22.19 KG/M2 | RESPIRATION RATE: 18 BRPM | SYSTOLIC BLOOD PRESSURE: 127 MMHG | WEIGHT: 155 LBS | HEART RATE: 71 BPM | OXYGEN SATURATION: 99 % | HEIGHT: 70 IN

## 2024-07-10 LAB
DEPRECATED RDW RBC AUTO: 44 FL (ref 35.1–46.3)
ERYTHROCYTE [DISTWIDTH] IN BLOOD BY AUTOMATED COUNT: 13.2 % (ref 11–15)
HCT VFR BLD AUTO: 25.6 %
HGB BLD-MCNC: 8.7 G/DL
MCH RBC QN AUTO: 30.5 PG (ref 26–34)
MCHC RBC AUTO-ENTMCNC: 34 G/DL (ref 31–37)
MCV RBC AUTO: 89.8 FL
PLATELET # BLD AUTO: 175 10(3)UL (ref 150–450)
RBC # BLD AUTO: 2.85 X10(6)UL
WBC # BLD AUTO: 8.3 X10(3) UL (ref 4–11)

## 2024-07-10 PROCEDURE — 99214 OFFICE O/P EST MOD 30 MIN: CPT | Performed by: INTERNAL MEDICINE

## 2024-07-10 RX ORDER — HYDROCODONE BITARTRATE AND ACETAMINOPHEN 7.5; 325 MG/1; MG/1
1 TABLET ORAL EVERY 6 HOURS PRN
Qty: 40 TABLET | Refills: 0 | Status: SHIPPED | OUTPATIENT
Start: 2024-07-10

## 2024-07-10 RX ORDER — PSEUDOEPHEDRINE HCL 30 MG
100 TABLET ORAL 2 TIMES DAILY
Qty: 20 CAPSULE | Refills: 0 | Status: SHIPPED | OUTPATIENT
Start: 2024-07-10

## 2024-07-10 NOTE — CM/SW NOTE
07/10/24 1200   Discharge disposition   Expected discharge disposition Home-Health   Post Acute Care Provider [facility]   Discharge transportation Private car     REGINO confirmed with RN Anna who stated pt is medically ready for discharge today.  Patient has a discharge order,     HH updates attached via Aidin to [facility] .  Liaison Becky from [facility]  made aware of discharge through secure chat/Aidin Messages.    The patient is aware [facility] will contact him for SOC.    REGINO confirmed that [facility]  contact information added to AVS.    PLAN: DC home with [facility]     Halle Sal MSW, LSW [phone]

## 2024-07-10 NOTE — PLAN OF CARE
Patient discharged home via private vehicle provided by wife. Patient verbalized understanding of discharge instructions prior to going home. IV removed before leaving.   Problem: Patient Centered Care  Goal: Patient preferences are identified and integrated in the patient's plan of care  Description: Interventions:  - What would you like us to know as we care for you?   - Provide timely, complete, and accurate information to patient/family  - Incorporate patient and family knowledge, values, beliefs, and cultural backgrounds into the planning and delivery of care  - Encourage patient/family to participate in care and decision-making at the level they choose  - Honor patient and family perspectives and choices  Outcome: Adequate for Discharge     Problem: Patient/Family Goals  Goal: Patient/Family Long Term Goal  Description: Patient's Long Term Goal:    Interventions  - See additional Care Plan goals for specific interventions  Outcome: Adequate for Discharge  Goal: Patient/Family Short Term Goal  Description: Patient's Short Term Goal:     Interventions:   - See additional Care Plan goals for specific interventions  Outcome: Adequate for Discharge

## 2024-07-10 NOTE — DISCHARGE INSTRUCTIONS
Home care nurse to remove wound vac dressing on Tuesday.  Apply Mepilex dressing.  Change dressing every 3-5 days.  Keep wound dry for 2 weeks.  Ambulate with walker and assist    Sometimes managing your health at home requires assistance.  The [facility] team has recognized your preference to use [facility].  They can be reached by phone at [phone].  The fax number for your reference is [phone].  A representative from the home health agency will contact you or your family to schedule your first visit.

## 2024-07-10 NOTE — HOME CARE LIAISON
Received referral via [facility] for Home Health services. Spoke w/ patient and his wife who is agreeable with [facility]. Contact information placed on AVS.

## 2024-07-10 NOTE — OCCUPATIONAL THERAPY NOTE
OCCUPATIONAL THERAPY EVALUATION - INPATIENT     Room Number: 409/409-A  Evaluation Date: 7/10/2024  Type of Evaluation: Initial  Presenting Problem: LT FAVIO    Physician Order: IP Consult to Occupational Therapy  Reason for Therapy: ADL/IADL Dysfunction and Discharge Planning    OCCUPATIONAL THERAPY ASSESSMENT   Patient is a 64 year old male admitted 2024 for LT FAVIO, anterior approach, WBAT.  Prior to admission, patient's baseline is independent.  Patient is currently functioning near baseline with ADLs and functional mobility. Further skilled OT services are not indicated. Anticipate pt is appropriate to return home with the PRN assist of his wife.     PLAN  Patient has been evaluated and presents with no skilled Occupational Therapy needs  at this time.  Patient will be discharged from Occupational Therapy services. Please re-order if a new functional limitation presents during this admission.     OCCUPATIONAL THERAPY MEDICAL/SOCIAL HISTORY   Problem List  Principal Problem:    Primary osteoarthritis of left hip  Active Problems:    Essential hypertension    HOME SITUATION  Type of Home: House  Home Layout: Two level (2 VIOLET, 5 to 2nd level)  Lives With: Spouse  Toilet and Equipment: Comfort height toilet  Shower/Tub and Equipment: Walk-in shower; Tub-shower combo; Shower chair  Other Equipment: -- (R/W)  Occupation/Status: postal service  Drives: Yes      Prior Level of [place]: pt lives with wife, independent without AD. Pt works and drives. Pt reports using a R/W x1 day following recent RT FAVIO earlier this year and returned to performing all tasks without intervention.     SUBJECTIVE  \"I don't remember having OT last time\"    OCCUPATIONAL THERAPY EXAMINATION    OBJECTIVE  Precautions: -- (Pressure dressing)  Fall Risk: Standard fall risk    WEIGHT BEARING RESTRICTION  L Lower Extremity: Weight Bearing as Tolerated    PAIN ASSESSMENT  Ratin  Location: LT hip  Management Techniques:  Repositioning    ACTIVITY TOLERANCE  Performs all requested in room activity without SOB, minimal pain LT hip    COGNITION  Intact however pt is impulsive  Benefits from repeated cues to perform ADL tasks/mobility as instructed    Behavioral/Emotional/Social: pleasant, impulsive    RANGE OF MOTION   Upper extremity ROM is within functional limits     STRENGTH ASSESSMENT  Upper extremity strength is within functional limits     ACTIVITIES OF DAILY LIVING ASSESSMENT  AM-PAC ‘6-Clicks’ Inpatient Daily Activity Short Form  How much help from another person does the patient currently need…  -   Putting on and taking off regular lower body clothing?: A Little  -   Bathing (including washing, rinsing, drying)?: None  -   Toileting, which includes using toilet, bedpan or urinal? : None  -   Putting on and taking off regular upper body clothing?: None  -   Taking care of personal grooming such as brushing teeth?: None  -   Eating meals?: None    AM-PAC Score:  Score: 23  Approx Degree of Impairment: 15.86%  Standardized Score (AM-PAC Scale): 51.12  CMS Modifier (G-Code): CI    FUNCTIONAL TRANSFER ASSESSMENT  Sit to Stand: Chair  Chair: Independent  Toilet Transfer: Independent  Simulated Car Transfer: Supervision    BED MOBILITY  Rolling: Not Tested  Supine to Sit : Not tested  Sit to Supine (OT): Not Tested    FUNCTIONAL ADL ASSESSMENT  After instruction on proper body mechanics and compensatory techniques, pt manages underwear, shorts, and socks with cues. Pt moves quickly and declines assistance despite demo struggle to reach LT foot comfortably. Encouraged pt to avoid extremes in ROM and to stop if experiencing pain. Pt reports his wife will be available to assist if indicated.     Skilled Therapy Provided: Pt benefits from repeated cues to apply instructions to tasks during session. Pt initially declining to use R/W however pt demo weakness in LT hip flexors which result in difficulty clearing LT foot with functional  mobility. With further education on importance of proper body mechanics and fall prevention, pt reports understanding of recommendation to use R/W until cleared by PT.     EDUCATION PROVIDED  Patient: Role of Occupational Therapy; Discharge Recommendations; DME Recommendations; Functional Transfer Techniques; Posture/Positioning; Compensatory ADL Techniques; Proper Body Mechanics  Patient's Response to Education: Verbalized Understanding; Returned Demonstration    The patient's Approx Degree of Impairment: 15.86% has been calculated based on documentation in the [facility] '6 clicks' Inpatient Daily Activity Short Form.  Research supports that patients with this level of impairment have no further OT needs Recommend continued PT services upon discharge. .  Final disposition will be made by interdisciplinary medical team.    Patient End of Session: Up in chair;Needs met;Call light within reach;RN aware of session/findings;All patient questions and concerns addressed      Patient Evaluation Complexity Level:   Occupational Profile/Medical History LOW - Brief history including review of medical or therapy records    Specific performance deficits impacting engagement in ADL/IADL LOW  1 - 3 performance deficits    Client Assessment/Performance Deficits LOW - No comorbidities nor modifications of tasks    Clinical Decision Making LOW - Analysis of occupational profile, problem-focused assessments, limited treatment options    Overall Complexity LOW       Self-Care Home Management: 15 minutes

## 2024-07-10 NOTE — DISCHARGE SUMMARY
[facility]  part of [facility]    Discharge Summary    Saturnino Patterson Patient Status:  Outpatient in a Bed    1960 MRN [id number]   Location [facility] 4W/SW/SE Attending Irwin Angulo MD   Hosp Day # 0 PCP Rowdy Lang DO     Date of Admission: 2024     Date of Discharge: 07/10/24      Lace+ Score: 25  59-90 High Risk  29-58 Medium Risk  0-28   Low Risk.    TCM Follow-Up Recommendation:  LACE < 29: Low Risk of readmission after discharge from the hospital; Still recommend for TCM follow-up.    DISCHARGE DX: Principal Problem:    Primary osteoarthritis of left hip  Active Problems:    Essential hypertension       The patient was seen and examined on day of discharge and this discharge summary is in conjunction with any daily progress note from day of discharge.    HPI per admitting physician: \"Patient is a 64-year-old  male with chronic left hip pain and underlying severe primary osteoarthritis, failed outpatient conservative medical management options. Scheduled today for above-mentioned procedure by his orthopedic surgeon, Dr. Caesar Negro. Preoperatively, a spinal block. Postoperatively, transferred to PACU for further monitoring. \"    Hospital Course:      Primary osteoarthritis of left hip  - s/p Left total hip arthroplasty on 24  - cont pain control, close monitoring with narcotics  - Encourage Incentive spirometry  - PT/OT per Ortho  - F/u Ortho as outpt    HTN  - controlled  - CPM  - Monitor and adjust as needed            Physical Exam:    Vitals:    24 1827 07/09/24 2007 07/10/24 0028 07/10/24 0544   BP: (!) 157/96 148/83 142/82 127/77   BP Location: Right arm Right arm Right arm Right arm   Pulse: 65 71 63 71   Resp: 16 18 18 18   Temp: 98.2 °F (36.8 °C) 97.5 °F (36.4 °C) 97.4 °F (36.3 °C) 97.5 °F (36.4 °C)   TempSrc: Oral Temporal Temporal Temporal   SpO2: 100% 99% 99% 99%   Weight:       Height:         No data  found.    Intake/Output Summary (Last 24 hours) at 7/10/2024 1133  Last data filed at 7/10/2024 0544  Gross per 24 hour   Intake 120 ml   Output 1250 ml   Net -1130 ml         GENERAL:  Awake and alert, in no acute distress.  HEART:  S1 and S2 heard.  RRR   LUNGS:  Air entry was good.  No crackles or wheezes   ABDOMEN: Soft and non-tender.    PSYCHIATRIC: Normal mood    CULTURE:   No results found for this visit on 07/09/24.    IMAGING STUDIES: SOME MAY NEED FOLLOW UP WITH PCP   XR FLUOROSCOPY C-ARM TIME LESS THAN 1 HOUR (CPT=76000)    Result Date: 7/9/2024  CONCLUSION: Intraoperative fluoroscopy provided.  Please see surgical note for specific details.    Dictated by (CST): Jaime Bush MD on 7/09/2024 at 6:02 PM     Finalized by (CST): Jaime Bush MD on 7/09/2024 at 6:02 PM          XR HIP W OR WO PELVIS 2 OR 3 VIEWS, LEFT (CPT=73502)    Result Date: 7/9/2024  CONCLUSION:  1. Bilateral hip prosthesis in anatomic position.  Recent left hip arthroplasty.  No adverse features. 2. No acute fracture or dislocation.    Dictated by (CST): Braydon Suresh MD on 7/09/2024 at 3:09 PM     Finalized by (CST): Braydon Suresh MD on 7/09/2024 at 3:11 PM           LABS :     Lab Results   Component Value Date    WBC 8.3 07/10/2024    HGB 8.7 (L) 07/10/2024    HCT 25.6 (L) 07/10/2024    .0 07/10/2024    CREATSERUM 0.96 05/29/2024    BUN 15 05/29/2024     05/29/2024    K 4.1 05/29/2024     05/29/2024    CO2 28.0 05/29/2024     (H) 05/29/2024    CA 8.2 (L) 05/29/2024    ALB 4.3 05/14/2024    ALKPHO 82 05/14/2024    BILT 0.8 05/14/2024    TP 7.1 05/14/2024    AST 21 05/14/2024    ALT 21 05/14/2024    TSH 2.311 05/29/2024       Recent Labs   Lab 07/03/24  1539 07/09/24  1330 07/10/24  0411   RBC 4.18 3.56* 2.85*   HGB 12.4* 10.7* 8.7*   HCT 37.8* 33.0* 25.6*   MCV 90.4 92.7 89.8   MCH 29.7 30.1 30.5   MCHC 32.8 32.4 34.0   RDW 13.3 13.4 13.2   NEPRELIM 3.53  --   --    WBC 6.0 7.4 8.3    .0 195.0 175.0     No results for input(s): \"GLU\", \"BUN\", \"CREATSERUM\", \"GFRAA\", \"GFRNAA\", \"CA\", \"ALB\", \"NA\", \"K\", \"CL\", \"CO2\", \"ALKPHO\", \"AST\", \"ALT\", \"BILT\", \"TP\" in the last 168 hours.  No results found for: \"PT\", \"INR\"    Disposition: Discharge to Home    Condition at Discharge: Stable     Discharge Medications:      Discharge Medications        START taking these medications        Instructions Prescription details   docusate sodium 100 MG Caps  Commonly known as: COLACE      Take 100 mg by mouth 2 (two) times daily.   Quantity: 20 capsule  Refills: 0     HYDROcodone-acetaminophen 7.5-325 MG Tabs  Commonly known as: Norco      Take 1 tablet by mouth every 6 (six) hours as needed.   Quantity: 40 tablet  Refills: 0            CONTINUE taking these medications        Instructions Prescription details   ALPRAZolam 0.25 MG Tabs  Commonly known as: Xanax      Take 1 tablet (0.25 mg total) by mouth 3 (three) times daily as needed.   Quantity: 20 tablet  Refills: 0     aspirin 325 MG Tbec      Take 1 tablet (325 mg total) by mouth in the morning and 1 tablet (325 mg total) before bedtime.   Quantity: 60 tablet  Refills: 0     ferrous sulfate 325 (65 FE) MG Tbec      Take 1 tablet (325 mg total) by mouth daily with breakfast.   Quantity: 20 tablet  Refills: 0     PARoxetine 30 MG Tabs  Commonly known as: Paxil      TAKE 1 TABLET BY MOUTH EVERY DAY   Quantity: 90 tablet  Refills: 3     Telmisartan 20 MG Tabs      Take 1 tablet (20 mg total) by mouth daily.   Quantity: 30 tablet  Refills: 5               Where to Get Your Medications        These medications were [id number] [place] [facility] [address] AT [facility], [place], [phone], [phone]  [address] [facility], [place] [address]      Hours: 24-hours Phone: [phone]   docusate sodium 100 MG Caps  HYDROcodone-acetaminophen 7.5-325 MG Tabs         Follow up Visits  Thang Perkins PA-C  [address] [place] [address]  [address] [place] [address]  [phone]    Follow up on 8/7/2024      Rowdy Lnag DO    Consultants         Provider   Role Specialty     Meme Matthews MD      Consulting Physician HOSPITALIST              Other Discharge Instructions:       Discharge Instructions         Home care nurse to remove wound vac dressing on Tuesday.  Apply Mepilex dressing.  Change dressing every 3-5 days.  Keep wound dry for 2 weeks.  Ambulate with walker and assist        ----------------------------------------------------  33 MIN SPENT ON THIS DC   Irwin Angulo MD    7/10/2024

## 2024-07-11 ENCOUNTER — TELEPHONE (OUTPATIENT)
Dept: ORTHOPEDICS CLINIC | Facility: CLINIC | Age: 64
End: 2024-07-11

## 2024-07-11 NOTE — TELEPHONE ENCOUNTER
S/w patient- He states that he is feeling improved. I advised that he take aspirin 325mg 2x/day for 30 days just like his procedure in May. I also stated that he could purchase oral iron over-the-counter and take for 20 days like the order on 5/30/24. He had no other questions at this time

## 2024-07-11 NOTE — TELEPHONE ENCOUNTER
Patient s/p left FAVIO on 7/9/24    S/w [facility] RN- She states that patient had episode of dizziness last night as her went to the bathroom where he felt dizzy and lightheaded and had to sit down. He also had similar episode this morning when he went to the bathroom. She took BP of 120/80 while lying down and then it was 100/64 while sitting up. She states that this is his only concern and that he is not having any shortness or breath or chest pain. I informed her that I would call patient to assess. Per  RN- she states that patient told her that this happened the last time he had surgery of the hip.    S/w patient- He state that he returned from hospital yesterday and states that he may have passed out while sitting on the toilet, but he cannot remember. He states that he feels dizzy while going from laying to sitting and sitting to standing. He informed me that Dr Negro did his right hip back in May and that he passed out 2 times while in the hospital. He states that he still feel dizzy with standing this morning. He denies chest pain and shortness of breath. I asked if he is drinking fluids and eating food. He states that he has not bring drinking that much and that he did not eat since last night. I told him that he should prioritize drinking water and eating high protein/high fiber diet to help with his recovery. He was agreeable to plan. He states that pain is well controlled with just tylenol at this time. He is on BP from his PCP that he did not take this morning. I informed him that he should follow up with PCP for recommendations on his home meds. He stated that he was going to call Dr Lang after this phone call. I informed him that if he continues to pass out, he should go to ER to be assessed. He states that he was given oral iron and blood thinner from Dr Negro after his right hip surgery. He was wondering if that would be indicated.    Please advise on order for oral iron and blood thinner  like the last surgery    Remaining post-op call was completed with no concerns  Post-op call for Dr. Negro    Date: 7/11/2024      Time: 11:13 AM   Patient: Saturnion Patterson      number: [id number]   Surgery and surgery date:Left total hip arthroplasty, anterior approach    Patient's general feeling since discharge: dizzy with standing. Otherwise no concerns  Pain control (0-10): 1  Pain Medication:  dose/strength- currently taking tyelnol 1000mg  Fever:  no  Chills:   no  SOB:   no  Incision site appearance:  Redness   no  Drainage         no  Clean/dry/Intact no  Calf pain, redness or warmth:    no   Bowel Regimen: yes no   If not, what are you taking stool softener/laxative?  Confirmed appointment date for post op: yes  Other concerns patients may ask:   Bathing and bandages   Patient needs to keep incision clean and dry for the first week.  Enforce rest, ice, compression elevation and use their pain medication accordingly.  If the patient needs more pain medication, please put in a communication.

## 2024-07-11 NOTE — TELEPHONE ENCOUNTER
Any over the counter iron supplement is fine. He should be taking aspirin, 325mg tablet 2 times per day for DVT prophylaxis. That is the same \"blood thinner\" that he received after his first surgery.

## 2024-07-11 NOTE — TELEPHONE ENCOUNTER
Per Bright states patient reported when going to washroom last night he felt like he was going to pass out, unsure if he did, but felt dizzy as well, also states patient had same feeling today, states she took his blood pressure and it was 120/80 lying down and 100/64 sitting up. Please call bright at [phone].

## 2024-07-12 ENCOUNTER — TELEPHONE (OUTPATIENT)
Dept: ORTHOPEDICS CLINIC | Facility: CLINIC | Age: 64
End: 2024-07-12

## 2024-07-12 DIAGNOSIS — Z96.642 STATUS POST TOTAL REPLACEMENT OF LEFT HIP: Primary | ICD-10-CM

## 2024-07-12 NOTE — TELEPHONE ENCOUNTER
[facility] calling to see if patient can have their outpatient therapy in [facility]. Per nurse patient has had it there before and would like to know if order can be sent there. Fax number is [phone]

## 2024-07-12 NOTE — TELEPHONE ENCOUNTER
Patient s/p left FAVIO on 7/9/24- Please advise on outpatient order so that patient can begin to schedule

## 2024-07-24 ENCOUNTER — TELEPHONE (OUTPATIENT)
Dept: ORTHOPEDICS CLINIC | Facility: CLINIC | Age: 64
End: 2024-07-24

## 2024-07-24 NOTE — TELEPHONE ENCOUNTER
Cassie from [facility] physical therapist calling stating patient discharge from home physical therapy yesterday and will start out patient next week.

## 2024-07-24 NOTE — TELEPHONE ENCOUNTER
FYI- Patient s/p left FAVIO on 7/9/24    Patient already has order that has been faxed to outpatient PT

## 2024-08-07 ENCOUNTER — TELEPHONE (OUTPATIENT)
Dept: ORTHOPEDICS CLINIC | Facility: CLINIC | Age: 64
End: 2024-08-07

## 2024-08-07 ENCOUNTER — OFFICE VISIT (OUTPATIENT)
Dept: ORTHOPEDICS CLINIC | Facility: CLINIC | Age: 64
End: 2024-08-07

## 2024-08-07 ENCOUNTER — HOSPITAL ENCOUNTER (OUTPATIENT)
Dept: GENERAL RADIOLOGY | Facility: HOSPITAL | Age: 64
Discharge: HOME | End: 2024-08-07
Attending: PHYSICIAN ASSISTANT
Payer: COMMERCIAL

## 2024-08-07 DIAGNOSIS — Z47.89 ORTHOPEDIC AFTERCARE: ICD-10-CM

## 2024-08-07 DIAGNOSIS — Z47.89 ORTHOPEDIC AFTERCARE: Primary | ICD-10-CM

## 2024-08-07 PROCEDURE — 73502 X-RAY EXAM HIP UNI 2-3 VIEWS: CPT | Performed by: PHYSICIAN ASSISTANT

## 2024-08-07 PROCEDURE — 1111F DSCHRG MED/CURRENT MED MERGE: CPT | Performed by: PHYSICIAN ASSISTANT

## 2024-08-07 PROCEDURE — 99024 POSTOP FOLLOW-UP VISIT: CPT | Performed by: PHYSICIAN ASSISTANT

## 2024-08-07 NOTE — PROGRESS NOTES
NURSING INTAKE COMMENTS:   Chief Complaint   Patient presents with    Post-Op     Left FAVIO - 1st visit - had sx on 24 - states he is fantastic - has no c/o -        HPI: This 64 year old male presents today for follow-up on his left hip.  His surgery was about 4 weeks ago.  Overall he is doing well.  He has been in outpatient therapy for a few weeks.  He has noted improvement in his motion and strength.  He has less pain.    Past Medical History:    Anxiety state    Depression    High blood pressure    Osteoarthritis     Past Surgical History:   Procedure Laterality Date    Total hip replacement Right      Current Outpatient Medications   Medication Sig Dispense Refill    docusate sodium 100 MG Oral Cap Take 100 mg by mouth 2 (two) times daily. 20 capsule 0    HYDROcodone-acetaminophen 7.5-325 MG Oral Tab Take 1 tablet by mouth every 6 (six) hours as needed. 40 tablet 0    Telmisartan 20 MG Oral Tab Take 1 tablet (20 mg total) by mouth daily. 30 tablet 5    PARoxetine 30 MG Oral Tab TAKE 1 TABLET BY MOUTH EVERY DAY 90 tablet 3    aspirin 325 MG Oral Tab EC Take 1 tablet (325 mg total) by mouth in the morning and 1 tablet (325 mg total) before bedtime. 60 tablet 0    ferrous sulfate 325 (65 FE) MG Oral Tab EC Take 1 tablet (325 mg total) by mouth daily with breakfast. 20 tablet 0    ALPRAZolam 0.25 MG Oral Tab Take 1 tablet (0.25 mg total) by mouth 3 (three) times daily as needed. 20 tablet 0     No Known Allergies  Family History   Problem Relation Age of Onset    Heart Disorder Father        Social History     Occupational History    Not on file   Tobacco Use    Smoking status: Former     Current packs/day: 0.00     Average packs/day: 0.5 packs/day for 5.0 years (2.5 ttl pk-yrs)     Types: Cigarettes     Start date:      Quit date: 1980     Years since quittin.6    Smokeless tobacco: Never   Vaping Use    Vaping status: Never Used   Substance and Sexual Activity    Alcohol use: Never    Drug use: Never     Sexual activity: Not on file        Review of Systems:  GENERAL: feels generally well, no significant weight loss or weight gain  SKIN: no ulcerated or worrisome skin lesions  EYES:denies blurred vision or double vision  HEENT: denies new nasal congestion, sinus pain or ST  LUNGS: denies shortness of breath  CARDIOVASCULAR: denies chest pain  GI: no hematemesis, no worsening heartburn, no diarrhea  : no dysuria, no blood in urine, no difficulty urinating, no incontinence  MUSCULOSKELETAL: no other musculoskeletal complaints other than in HPI  NEURO: no numbness or tingling, no weakness or balance disorder  PSYCHE: no depression or anxiety  HEMATOLOGIC: no hx of blood dyscrasia  ENDOCRINE: no thyroid or diabetes issues  ALL/ASTHMA: no new hx of severe allergy or asthma    Physical Examination:    There were no vitals taken for this visit.  Constitutional: appears well hydrated, alert and responsive, no acute distress noted  Extremities: The incision is well-healed.  No pain with range of motion of the hip.  Able to actively straight leg raise about 60 degrees.      Imaging: AP of the pelvis with AP and lateral views of the left hip demonstrate stable appearance following left total hip arthroplasty with no obvious complications    Lab Results   Component Value Date    WBC 8.3 07/10/2024    HGB 8.7 (L) 07/10/2024    .0 07/10/2024      Lab Results   Component Value Date     (H) 05/29/2024    BUN 15 05/29/2024    CREATSERUM 0.96 05/29/2024        Assessment and Plan:  Diagnoses and all orders for this visit:    Orthopedic aftercare  -     XR HIP W OR WO PELVIS 2 OR 3 VIEWS, LEFT (CPT=73502) [[id number]] - Orthopedic providers only; Future        Assessment: Status post left total hip arthroplasty through an anterior approach    Plan: Mr. Patterson is progressing well following his surgery.  He will continue with outpatient therapy.  He will be returning to work soon.  Will see him back in 6 weeks to assess  his progress.  Advised him to call if any questions or problems arise in the meantime    The above note was creating using Dragon speech recognition technology. Please excuse any typos.    This visit was performed under the supervision of Dr. Caesar Negro who formulated the treatment plan and decision making.

## 2025-06-27 ENCOUNTER — TELEPHONE (OUTPATIENT)
Dept: FAMILY MEDICINE CLINIC | Facility: CLINIC | Age: 65
End: 2025-06-27

## (undated) DEVICE — GAMMEX® PI HYBRID SIZE 9, STERILE POWDER-FREE SURGICAL GLOVE, POLYISOPRENE AND NEOPRENE BLEND: Brand: GAMMEX

## (undated) DEVICE — HOOD: Brand: FLYTE

## (undated) DEVICE — APPLICATOR PREP 26ML CHG 2% ISO ALC 70%

## (undated) DEVICE — SOLUTION IRRIG 1000ML 0.9% NACL USP BTL

## (undated) DEVICE — ADHESIVE LIQ 2/3ML VI MASTISOL

## (undated) DEVICE — SHEET,DRAPE,53X77,STERILE: Brand: MEDLINE

## (undated) DEVICE — 3M™ STERI-STRIP™ REINFORCED ADHESIVE SKIN CLOSURES, R1547, 1/2 IN X 4 IN (12 MM X 100 MM), 6 STRIPS/ENVELOPE: Brand: 3M™ STERI-STRIP™

## (undated) DEVICE — TRAY CATH FOLEY 16FR INCLUDE BARDX IC COMPLT CARE

## (undated) DEVICE — 2T11 #2 PDO 36 X 36: Brand: 2T11 #2 PDO 36 X 36

## (undated) DEVICE — SUCTION CANISTER, 3000CC,SAFELINER: Brand: DEROYAL

## (undated) DEVICE — Device: Brand: STABLECUT®

## (undated) DEVICE — BIPOLAR SEALER 23-112-1 AQM 6.0: Brand: AQUAMANTYS™

## (undated) DEVICE — MINI VERSALIGHT W/ EXT FRAZ TIP 4.5MM

## (undated) DEVICE — SUT VCRL 2-0 27IN FS-1 ABSRB UD L24MM 3/8 CIR

## (undated) DEVICE — GAMMEX® PI HYBRID SIZE 8.5, STERILE POWDER-FREE SURGICAL GLOVE, POLYISOPRENE AND NEOPRENE BLEND: Brand: GAMMEX

## (undated) DEVICE — GAMMEX® NON-LATEX PI ORTHO SIZE 9, STERILE POLYISOPRENE POWDER-FREE SURGICAL GLOVE: Brand: GAMMEX

## (undated) DEVICE — SOLUTION IV 1000ML 0.9% NACL PRESERVATIVE

## (undated) DEVICE — GAMMEX® NON-LATEX PI ORTHO SIZE 8.5, STERILE POLYISOPRENE POWDER-FREE SURGICAL GLOVE: Brand: GAMMEX

## (undated) DEVICE — SPONGE GZ 4X4IN COT 12 PLY TYP VII WVN C

## (undated) DEVICE — KIT EVAC 400CC 3/16IN PVC RND DRN Y CONN

## (undated) DEVICE — KIT INCIS MGMT PREVENA DRAIN 13CM PEEL AND PLC DISP

## (undated) DEVICE — VIOLET BRAIDED (POLYGLACTIN 910), SYNTHETIC ABSORBABLE SUTURE: Brand: COATED VICRYL

## (undated) DEVICE — ANTIBACTERIAL UNDYED BRAIDED (POLYGLACTIN 910), SYNTHETIC ABSORBABLE SUTURE: Brand: COATED VICRYL

## (undated) DEVICE — PACK CDS ANTERIOR HIP

## (undated) DEVICE — SUT COAT VCRL+ 1 18IN CTX ABSRB VLT ANTIBACT

## (undated) DEVICE — 2DE14 2-0 PDO 24 X 24: Brand: 2DE14 2-0 PDO 24 X 24

## (undated) DEVICE — 450 ML BOTTLE OF 0.05% CHLORHEXIDINE GLUCONATE IN 99.95% STERILE WATER FOR IRRIGATION, USP AND APPLICATOR.: Brand: IRRISEPT ANTIMICROBIAL WOUND LAVAGE

## (undated) NOTE — LETTER
Date & Time: 9/2/2020, 6:18 PM  Patient: Joellen Alcocer  Encounter Provider(s):    Kathi Boyd MD       To Whom It May Concern:    Shona Kelley was seen and treated in our department on 9/2/2020. He should not return to work until until 9/4/2020. Singh Langston